# Patient Record
Sex: MALE | Race: BLACK OR AFRICAN AMERICAN | Employment: FULL TIME | URBAN - METROPOLITAN AREA
[De-identification: names, ages, dates, MRNs, and addresses within clinical notes are randomized per-mention and may not be internally consistent; named-entity substitution may affect disease eponyms.]

---

## 2021-08-03 VITALS
SYSTOLIC BLOOD PRESSURE: 126 MMHG | RESPIRATION RATE: 17 BRPM | TEMPERATURE: 99 F | HEART RATE: 86 BPM | DIASTOLIC BLOOD PRESSURE: 74 MMHG | OXYGEN SATURATION: 96 %

## 2021-08-03 LAB
RAPID INFLUENZA  B AGN: NEGATIVE
RAPID INFLUENZA A AGN: NEGATIVE
SARS-COV-2, NAAT: NOT DETECTED

## 2021-08-03 PROCEDURE — 99283 EMERGENCY DEPT VISIT LOW MDM: CPT

## 2021-08-03 PROCEDURE — 87635 SARS-COV-2 COVID-19 AMP PRB: CPT

## 2021-08-03 PROCEDURE — 87804 INFLUENZA ASSAY W/OPTIC: CPT

## 2021-08-03 ASSESSMENT — PAIN SCALES - GENERAL: PAINLEVEL_OUTOF10: 3

## 2021-08-03 ASSESSMENT — PAIN DESCRIPTION - LOCATION: LOCATION: GENERALIZED

## 2021-08-03 ASSESSMENT — PAIN DESCRIPTION - PAIN TYPE: TYPE: ACUTE PAIN

## 2021-08-03 ASSESSMENT — PAIN DESCRIPTION - DESCRIPTORS: DESCRIPTORS: ACHING

## 2021-08-04 ENCOUNTER — HOSPITAL ENCOUNTER (EMERGENCY)
Age: 26
Discharge: HOME | End: 2021-08-04
Attending: EMERGENCY MEDICINE

## 2021-08-04 DIAGNOSIS — R05.9 COUGH: ICD-10-CM

## 2021-08-04 DIAGNOSIS — R52 BODY ACHES: Primary | ICD-10-CM

## 2021-08-04 RX ORDER — METHYLPREDNISOLONE 4 MG/1
TABLET ORAL
Qty: 1 KIT | Refills: 0 | Status: SHIPPED | OUTPATIENT
Start: 2021-08-04

## 2021-08-04 RX ORDER — OXYMETAZOLINE HYDROCHLORIDE 0.05 G/100ML
2 SPRAY NASAL 2 TIMES DAILY
Qty: 1 BOTTLE | Refills: 3 | Status: SHIPPED | OUTPATIENT
Start: 2021-08-04 | End: 2021-09-03

## 2021-08-04 ASSESSMENT — ENCOUNTER SYMPTOMS
WHEEZING: 0
SHORTNESS OF BREATH: 0
CHOKING: 0
BLOOD IN STOOL: 0
DIARRHEA: 0
ABDOMINAL DISTENTION: 0
RECTAL PAIN: 0
EYE ITCHING: 0
PHOTOPHOBIA: 0
EYE DISCHARGE: 0
BACK PAIN: 0
ABDOMINAL PAIN: 0
NAUSEA: 0
CONSTIPATION: 0
COLOR CHANGE: 0
CHEST TIGHTNESS: 0
APNEA: 0
STRIDOR: 0
COUGH: 1
ANAL BLEEDING: 0
EYE REDNESS: 0
EYE PAIN: 0
VOMITING: 0

## 2021-08-04 NOTE — ED TRIAGE NOTES
Mary Ramirez is a 32 y.o. male brought himself to the ER for eval of fatigue that started Friday and a cough for the last 3 days. The patient is alert and oriented with an open and patent airway with a normal respiratory effort.

## 2021-08-04 NOTE — ED PROVIDER NOTES
[address] [facility]      Pt Name: Yves Carbone  MRN: [id number]  [place] 1995  Date of evaluation: 8/3/2021  Provider: Yuri Mejias MD    CHIEF COMPLAINT       Chief Complaint   Patient presents with    Generalized Body Aches     complaining of fatiuge, achiness, and cough x3 days. HISTORY OF PRESENT ILLNESS    Yves Carbone is a 32 y.o. male who presents to the emergency department with cough, congestion, body aches. Endorses 3-day history of symptoms. Presents to the emergency room to make sure he does not have Covid or the flu. Endorses tactile fevers. Decreased p.o. intake. Tolerating fluids. No nausea or vomiting. No constipation or diarrhea. No chest pain or shortness of breath. No abdominal pain. Positive for sick contacts. Denies any Covid exposures. Has been taking over-the-counter medication with some relief. No other associated symptoms. Nursing Notes were reviewed. Including nursing noted for FM, Surgical History, Past Medical History, Social History, vitals, and allergies; agree with all. REVIEW OF SYSTEMS       Review of Systems   Constitutional: Positive for activity change, appetite change, chills, fatigue and fever. Negative for diaphoresis and unexpected weight change. HENT: Positive for congestion. Negative for dental problem, drooling, ear discharge and ear pain. Eyes: Negative for photophobia, pain, discharge, redness, itching and visual disturbance. Respiratory: Positive for cough. Negative for apnea, choking, chest tightness, shortness of breath, wheezing and stridor. Cardiovascular: Negative for chest pain, palpitations and leg swelling. Gastrointestinal: Negative for abdominal distention, abdominal pain, anal bleeding, blood in stool, constipation, diarrhea, nausea, rectal pain and vomiting. Endocrine: Negative for cold intolerance and heat intolerance.    Genitourinary: Negative for decreased urine volume and urgency. Musculoskeletal: Positive for myalgias. Negative for arthralgias and back pain. Skin: Negative for color change and pallor. Neurological: Negative for dizziness and facial asymmetry. Hematological: Negative for adenopathy. Does not bruise/bleed easily. Psychiatric/Behavioral: Negative for agitation, behavioral problems, confusion and decreased concentration. Except as noted above the remainder of the review of systems was reviewed and negative. PAST MEDICAL HISTORY   History reviewed. No pertinent past medical history. SURGICAL HISTORY     History reviewed. No pertinent surgical history. CURRENT MEDICATIONS       Discharge Medication List as of 8/4/2021  1:33 AM          ALLERGIES     Patient has no known allergies. FAMILY HISTORY      History reviewed. No pertinent family history. SOCIAL HISTORY       Social History     Socioeconomic History    Marital status: Single     Spouse name: None    Number of children: None    Years of education: None    Highest education level: None   Occupational History    None   Tobacco Use    Smoking status: Never Smoker    Smokeless tobacco: Never Used   Vaping Use    Vaping Use: Never used   Substance and Sexual Activity    Alcohol use: Never    Drug use: Never    Sexual activity: None   Other Topics Concern    None   Social History Narrative    None     Social Determinants of Health     Financial Resource Strain:     Difficulty of Paying Living Expenses:    Food Insecurity:     Worried About Running Out of Food in the Last Year:     Ran Out of Food in the Last Year:    Transportation Needs:     Lack of Transportation (Medical):      Lack of Transportation (Non-Medical):    Physical Activity:     Days of Exercise per Week:     Minutes of Exercise per Session:    Stress:     Feeling of Stress :    Social Connections:     Frequency of Communication with Friends and Family:     Frequency of Social Gatherings with Friends and Family:     Attends Protestant Services:     Active Member of Clubs or Organizations:     Attends Club or Organization Meetings:     Marital Status:    Intimate Partner Violence:     Fear of Current or Ex-Partner:     Emotionally Abused:     Physically Abused:     Sexually Abused:        PHYSICAL EXAM       ED Triage Vitals [08/03/21 2115]   BP Temp Temp Source Pulse Resp SpO2 Height Weight   126/74 99 °F (37.2 °C) Oral 86 17 96 % -- --       Physical Exam  Vitals and nursing note reviewed. Constitutional:       General: He is not in acute distress. Appearance: He is well-developed. He is not diaphoretic. HENT:      Head: Normocephalic and atraumatic. Eyes:      General:         Right eye: No discharge. Left eye: No discharge. Pupils: Pupils are equal, round, and reactive to light. Neck:      Thyroid: No thyromegaly. Trachea: No tracheal deviation. Cardiovascular:      Rate and Rhythm: Normal rate and regular rhythm. Heart sounds: No murmur heard. Pulmonary:      Breath sounds: No wheezing or rales. Chest:      Chest wall: No tenderness. Abdominal:      General: There is no distension. Palpations: Abdomen is soft. There is no mass. Tenderness: There is no abdominal tenderness. There is no guarding or rebound. Musculoskeletal:         General: No tenderness or deformity. Normal range of motion. Cervical back: Normal range of motion. Skin:     General: Skin is warm. Coloration: Skin is not pale. Findings: No erythema or rash. Neurological:      Mental Status: He is alert. Cranial Nerves: No cranial nerve deficit. Motor: No abnormal muscle tone.       Coordination: Coordination normal.         DIAGNOSTIC RESULTS     EKG: All EKG's are interpreted by the Emergency Department Physician who either signs or Co-signs this chart in the absence of acardiologist.    None    RADIOLOGY: Non-plain film images such as CT, Ultrasoundand MRI are read by the radiologist. Isabelle Adair radiographic images are visualized and preliminarily interpreted by the emergency physician with the below findings:    Chest x-ray reassuring    ED BEDSIDE ULTRASOUND:   Performed by ED Physician - none    LABS:  Labs Reviewed   RAPID INFLUENZA A/B ANTIGENS    Narrative:     Performed at:  [facility]  [address] [place], [facility] [place] [address]   Phone ([phone] RAPID    Narrative:     Performed at:  [facility]  [address] [place], [place] [address]   Phone [phone]       All other labs were withinnormal range or not returned as of this dictation. EMERGENCY DEPARTMENT COURSE and DIFFERENTIAL DIAGNOSIS/MDM:     PMH, Surgical Hx, FH, Social Hx reviewed by myself (ETOH usage, Tobacco usage, Drug usage reviewed by myself, no pertinent Hx)- No Pertinent Hx     Old records were reviewed by me    59-year-old male with upper respiratory infection. Supportive care. I estimate there is LOW risk for Sepsis, MI, Stroke, Tamponade, PTX, Toxicity or other life threatening etiology thus I consider the discharge disposition reasonable. The patient is at low risk for mortality based on demographic, history and clinical factors. Given the best available information and clinical assessment, I estimate the risk of hospitalization to be greater than risk of treatment at home. I have explained to the patient that the risk could rapidly change, given precautions for return and instructions. Explained to patient that the risk for mortality is low based on demographic, history and clinical factors. I discussed with patient the results of evaluation in the ED, diagnosis, care, and prognosis. The plan is to discharge to home. Patient is in agreement with plan and questions have been answered.       I also discussed with patient the reasons which may require a return visit and the importance of follow-up care. The patient is well-appearing, nontoxic, and improved at the time of discharge. Patient agrees to call to arrange follow-up care as directed. Patient understands to return immediately for worsening/change in symptoms. CRITICAL CARE TIME   Total Critical Caretime was 21 minutes, excluding separately reportable procedures. There was a high probability of clinically significant/life threatening deterioration in the patient's condition which required my urgent intervention. PROCEDURES:  Unlessotherwise noted below, none    FINAL IMPRESSION      1. Body aches    2.  Cough          DISPOSITION/PLAN   DISPOSITION Decision To Discharge 08/04/2021 01:30:39 AM    PATIENT REFERRED TO:  PCP            DISCHARGE MEDICATIONS:  Discharge Medication List as of 8/4/2021  1:33 AM      START taking these medications    Details   methylPREDNISolone (MEDROL, LISETTE,) 4 MG tablet Take by mouth., Disp-1 kit, R-0Print      oxymetazoline (12 HOUR NASAL SPRAY) 0.05 % nasal spray 2 sprays by Nasal route 2 times daily, Disp-1 Bottle, R-3Print                (Please note that portions ofthis note were completed with a voice recognition program.  Efforts were made to edit the dictations but occasionally words are mis-transcribed.)    Chelsie Clay MD(electronically signed)  Attending Emergency Physician        Chelsie Clay MD  08/04/21 0311

## 2021-08-04 NOTE — ED NOTES
.Pt discharged at this time. Discharge instructions and medications reviewed,  Questions were answered. PT verbalized understanding. Follow up appointments were discussed.          [facility]  08/04/21 4722

## 2023-04-18 ENCOUNTER — HOSPITAL ENCOUNTER (EMERGENCY)
Age: 28
Discharge: HOME | End: 2023-04-18

## 2023-04-18 ENCOUNTER — APPOINTMENT (OUTPATIENT)
Dept: CT IMAGING | Age: 28
End: 2023-04-18

## 2023-04-18 VITALS
OXYGEN SATURATION: 98 % | WEIGHT: 184.97 LBS | SYSTOLIC BLOOD PRESSURE: 125 MMHG | DIASTOLIC BLOOD PRESSURE: 75 MMHG | RESPIRATION RATE: 17 BRPM | TEMPERATURE: 98.3 F | HEART RATE: 84 BPM

## 2023-04-18 DIAGNOSIS — K56.41 FECAL IMPACTION (HCC): ICD-10-CM

## 2023-04-18 DIAGNOSIS — K59.00 CONSTIPATION, UNSPECIFIED CONSTIPATION TYPE: Primary | ICD-10-CM

## 2023-04-18 LAB
ALBUMIN SERPL-MCNC: 4.3 G/DL (ref 3.4–5)
ALBUMIN/GLOB SERPL: 1.3 {RATIO} (ref 1.1–2.2)
ALP SERPL-CCNC: 58 U/L (ref 40–129)
ALT SERPL-CCNC: 22 U/L (ref 10–40)
ANION GAP SERPL CALCULATED.3IONS-SCNC: 11 MMOL/L (ref 3–16)
AST SERPL-CCNC: 25 U/L (ref 15–37)
BASOPHILS # BLD: 0 K/UL (ref 0–0.2)
BASOPHILS NFR BLD: 0.4 %
BILIRUB SERPL-MCNC: 0.4 MG/DL (ref 0–1)
BILIRUB UR QL STRIP.AUTO: NEGATIVE
BUN SERPL-MCNC: 8 MG/DL (ref 7–20)
CALCIUM SERPL-MCNC: 9 MG/DL (ref 8.3–10.6)
CHLORIDE SERPL-SCNC: 102 MMOL/L (ref 99–110)
CLARITY UR: CLEAR
CO2 SERPL-SCNC: 27 MMOL/L (ref 21–32)
COLOR UR: YELLOW
CREAT SERPL-MCNC: 0.9 MG/DL (ref 0.9–1.3)
DEPRECATED RDW RBC AUTO: 14.2 % (ref 12.4–15.4)
EOSINOPHIL # BLD: 0 K/UL (ref 0–0.6)
EOSINOPHIL NFR BLD: 0.4 %
GFR SERPLBLD CREATININE-BSD FMLA CKD-EPI: >60 ML/MIN/{1.73_M2}
GLUCOSE SERPL-MCNC: 101 MG/DL (ref 70–99)
GLUCOSE UR STRIP.AUTO-MCNC: NEGATIVE MG/DL
HCT VFR BLD AUTO: 47.1 % (ref 40.5–52.5)
HGB BLD-MCNC: 15.8 G/DL (ref 13.5–17.5)
HGB UR QL STRIP.AUTO: NEGATIVE
KETONES UR STRIP.AUTO-MCNC: NEGATIVE MG/DL
LEUKOCYTE ESTERASE UR QL STRIP.AUTO: NEGATIVE
LIPASE SERPL-CCNC: 17 U/L (ref 13–60)
LYMPHOCYTES # BLD: 2 K/UL (ref 1–5.1)
LYMPHOCYTES NFR BLD: 28.1 %
MCH RBC QN AUTO: 29.5 PG (ref 26–34)
MCHC RBC AUTO-ENTMCNC: 33.6 G/DL (ref 31–36)
MCV RBC AUTO: 87.9 FL (ref 80–100)
MONOCYTES # BLD: 0.4 K/UL (ref 0–1.3)
MONOCYTES NFR BLD: 5.5 %
NEUTROPHILS # BLD: 4.6 K/UL (ref 1.7–7.7)
NEUTROPHILS NFR BLD: 65.6 %
NITRITE UR QL STRIP.AUTO: NEGATIVE
PH UR STRIP.AUTO: 6.5 [PH] (ref 5–8)
PLATELET # BLD AUTO: 201 K/UL (ref 135–450)
PMV BLD AUTO: 9 FL (ref 5–10.5)
POTASSIUM SERPL-SCNC: 3.9 MMOL/L (ref 3.5–5.1)
PROT SERPL-MCNC: 7.6 G/DL (ref 6.4–8.2)
PROT UR STRIP.AUTO-MCNC: NEGATIVE MG/DL
RBC # BLD AUTO: 5.36 M/UL (ref 4.2–5.9)
SODIUM SERPL-SCNC: 140 MMOL/L (ref 136–145)
SP GR UR STRIP.AUTO: 1.02 (ref 1–1.03)
UA COMPLETE W REFLEX CULTURE PNL UR: NORMAL
UA DIPSTICK W REFLEX MICRO PNL UR: NORMAL
URN SPEC COLLECT METH UR: NORMAL
UROBILINOGEN UR STRIP-ACNC: 0.2 E.U./DL
WBC # BLD AUTO: 7 K/UL (ref 4–11)

## 2023-04-18 PROCEDURE — 83690 ASSAY OF LIPASE: CPT

## 2023-04-18 PROCEDURE — 81003 URINALYSIS AUTO W/O SCOPE: CPT

## 2023-04-18 PROCEDURE — 85025 COMPLETE CBC W/AUTO DIFF WBC: CPT

## 2023-04-18 PROCEDURE — 80053 COMPREHEN METABOLIC PANEL: CPT

## 2023-04-18 PROCEDURE — 6360000004 HC RX CONTRAST MEDICATION: Performed by: PHYSICIAN ASSISTANT

## 2023-04-18 PROCEDURE — 99285 EMERGENCY DEPT VISIT HI MDM: CPT

## 2023-04-18 PROCEDURE — 74177 CT ABD & PELVIS W/CONTRAST: CPT

## 2023-04-18 RX ORDER — POLYETHYLENE GLYCOL 3350 17 G/17G
17 POWDER, FOR SOLUTION ORAL DAILY PRN
Qty: 527 G | Refills: 1 | Status: SHIPPED | OUTPATIENT
Start: 2023-04-18 | End: 2023-05-18

## 2023-04-18 RX ORDER — SODIUM PHOSPHATE, DIBASIC AND SODIUM PHOSPHATE, MONOBASIC 7; 19 G/133ML; G/133ML
2 ENEMA RECTAL
Qty: 2 EACH | Refills: 0 | Status: SHIPPED | OUTPATIENT
Start: 2023-04-18 | End: 2023-04-18

## 2023-04-18 RX ADMIN — IOPAMIDOL 75 ML: 755 INJECTION, SOLUTION INTRAVENOUS at 17:29

## 2023-04-18 ASSESSMENT — ENCOUNTER SYMPTOMS
VOMITING: 0
BLOOD IN STOOL: 0
COUGH: 0
SHORTNESS OF BREATH: 0
DIARRHEA: 0
ABDOMINAL PAIN: 1
COLOR CHANGE: 0
NAUSEA: 1
CONSTIPATION: 0

## 2023-04-18 ASSESSMENT — PAIN - FUNCTIONAL ASSESSMENT: PAIN_FUNCTIONAL_ASSESSMENT: 0-10

## 2023-04-18 ASSESSMENT — PAIN SCALES - GENERAL: PAINLEVEL_OUTOF10: 6

## 2023-04-18 ASSESSMENT — PAIN DESCRIPTION - LOCATION: LOCATION: ABDOMEN

## 2023-04-18 ASSESSMENT — PAIN DESCRIPTION - DESCRIPTORS: DESCRIPTORS: ACHING;DULL

## 2023-04-18 ASSESSMENT — PAIN DESCRIPTION - ORIENTATION: ORIENTATION: LEFT;LOWER

## 2023-04-18 ASSESSMENT — PAIN DESCRIPTION - PAIN TYPE: TYPE: ACUTE PAIN

## 2023-04-18 NOTE — ED PROVIDER NOTES
Status: He is alert and oriented to person, place, and time. Psychiatric:         Behavior: Behavior normal. Behavior is cooperative. DIAGNOSTIC RESULTS   LABS:    Labs Reviewed   COMPREHENSIVE METABOLIC PANEL W/ REFLEX TO MG FOR LOW K - Abnormal; Notable for the following components:       Result Value    Glucose 101 (*)     All other components within normal limits   CBC WITH AUTO DIFFERENTIAL   LIPASE   URINALYSIS WITH REFLEX TO CULTURE       When ordered only abnormal lab results are displayed. All other labs were within normal range or not returned as of this dictation. EKG: When ordered, EKG's are interpreted by the Emergency Department Physician in the absence of a cardiologist.  Please see their note for interpretation of EKG. RADIOLOGY:   Non-plain film images such as CT, Ultrasound and MRI are read by the radiologist. Plain radiographic images are visualized and preliminarily interpreted by the ED Provider with the below findings:    Interpretation per the Radiologist below, if available at the time of this note:    CT ABDOMEN PELVIS W IV CONTRAST Additional Contrast? None   Preliminary Result   1. Normal appendix. Evidence of constipation and stool impaction in the   rectum. No bowel obstruction or perforation or thickening. 2. No evidence of gallbladder disease. No evidence of renal stones or   obstructive uropathy. No results found. No results found. PROCEDURES   Unless otherwise noted below, none     Procedures    PAST MEDICAL HISTORY      has no past medical history on file.      EMERGENCY DEPARTMENT COURSE and DIFFERENTIAL DIAGNOSIS/MDM:   Vitals:    Vitals:    04/18/23 1529 04/18/23 1534   BP:  125/75   Pulse: 84    Resp: 17    Temp: 98.3 °F (36.8 °C)    TempSrc: Oral    SpO2: 98%    Weight: 184 lb 15.5 oz (83.9 kg)        Patient was given the following medications:  Medications   iopamidol (ISOVUE-370) 76 % injection 75 mL (75 mLs IntraVENous Given 4/18/23 4499)

## 2024-07-24 ENCOUNTER — APPOINTMENT (OUTPATIENT)
Dept: GENERAL RADIOLOGY | Age: 29
End: 2024-07-24
Payer: COMMERCIAL

## 2024-07-24 ENCOUNTER — HOSPITAL ENCOUNTER (EMERGENCY)
Age: 29
Discharge: HOME | End: 2024-07-24
Attending: EMERGENCY MEDICINE
Payer: COMMERCIAL

## 2024-07-24 VITALS
RESPIRATION RATE: 22 BRPM | SYSTOLIC BLOOD PRESSURE: 113 MMHG | HEART RATE: 78 BPM | TEMPERATURE: 98 F | WEIGHT: 169.75 LBS | DIASTOLIC BLOOD PRESSURE: 79 MMHG | OXYGEN SATURATION: 99 %

## 2024-07-24 DIAGNOSIS — S61.241A: Primary | ICD-10-CM

## 2024-07-24 PROCEDURE — 6360000002 HC RX W HCPCS: Performed by: EMERGENCY MEDICINE

## 2024-07-24 PROCEDURE — 90471 IMMUNIZATION ADMIN: CPT | Performed by: EMERGENCY MEDICINE

## 2024-07-24 PROCEDURE — 90715 TDAP VACCINE 7 YRS/> IM: CPT | Performed by: EMERGENCY MEDICINE

## 2024-07-24 PROCEDURE — 73130 X-RAY EXAM OF HAND: CPT

## 2024-07-24 PROCEDURE — 6370000000 HC RX 637 (ALT 250 FOR IP): Performed by: EMERGENCY MEDICINE

## 2024-07-24 PROCEDURE — 99284 EMERGENCY DEPT VISIT MOD MDM: CPT

## 2024-07-24 PROCEDURE — 2500000003 HC RX 250 WO HCPCS: Performed by: EMERGENCY MEDICINE

## 2024-07-24 RX ORDER — LIDOCAINE HYDROCHLORIDE 10 MG/ML
10 INJECTION, SOLUTION EPIDURAL; INFILTRATION; INTRACAUDAL; PERINEURAL ONCE
Status: COMPLETED | OUTPATIENT
Start: 2024-07-24 | End: 2024-07-24

## 2024-07-24 RX ORDER — CEPHALEXIN 500 MG/1
500 CAPSULE ORAL ONCE
Status: COMPLETED | OUTPATIENT
Start: 2024-07-24 | End: 2024-07-24

## 2024-07-24 RX ORDER — CEPHALEXIN 500 MG/1
500 CAPSULE ORAL 4 TIMES DAILY
Qty: 20 CAPSULE | Refills: 0 | Status: SHIPPED | OUTPATIENT
Start: 2024-07-24 | End: 2024-07-29

## 2024-07-24 RX ADMIN — CEPHALEXIN 500 MG: 500 CAPSULE ORAL at 21:48

## 2024-07-24 RX ADMIN — LIDOCAINE HYDROCHLORIDE 10 ML: 10 INJECTION, SOLUTION EPIDURAL; INFILTRATION; INTRACAUDAL; PERINEURAL at 21:47

## 2024-07-24 RX ADMIN — TETANUS TOXOID, REDUCED DIPHTHERIA TOXOID AND ACELLULAR PERTUSSIS VACCINE, ADSORBED 0.5 ML: 5; 2.5; 8; 8; 2.5 SUSPENSION INTRAMUSCULAR at 21:49

## 2024-07-25 ENCOUNTER — TELEPHONE (OUTPATIENT)
Dept: ORTHOPEDIC SURGERY | Age: 29
End: 2024-07-25

## 2024-07-25 NOTE — DISCHARGE INSTRUCTIONS
Follow-up with the hand surgeon in 1 to 2 days for reexamination.    Watch for increased pain redness swelling fever or chills.  Keep clean and dry.    If condition worsens or new symptoms develop, return immediately to the emergency department

## 2024-07-25 NOTE — TELEPHONE ENCOUNTER
Unable to reach patient regarding being referred to outside facility due to limited appointments with our hand providers. Upon return call please relay info to patient.     Can follow up with either [facility] [phone] or [facility] [phone]

## 2024-07-25 NOTE — ED PROVIDER NOTES
provided.    Staple was removed easily after digital block.    Repeat left hand x-ray was obtained and reviewed.  There is no evidence of any retained foreign body.    He will be referred to hand surgery for follow-up in 1 to 2 days given the fact that the staple did penetrate the bone.  He was advised to watch for any infection and he will be placed on Keflex 500 mg 4 times daily for 5 days.  I recommended Tylenol or ibuprofen as directed for pain..  Advised him to call today for an appointment to be seen in 1 to 2 days.    Social Determinants of health were reviewed (Poverty, Education, uninsured) -none       Is this patient to be included in the SEP-1 Core Measure due to severe sepsis or septic shock?   No   Exclusion criteria - the patient is NOT to be included for SEP-1 Core Measure due to:  2+ SIRS criteria are not met      REASSESSMENT/ MEDICAL DECISION MAKING            Patient was given the following medications:   Medications   lidocaine PF 1 % injection 10 mL (10 mLs IntraDERmal Given 7/24/24 2147)   tetanus-diphth-acell pertussis (BOOSTRIX) injection 0.5 mL (0.5 mLs IntraMUSCular Given 7/24/24 2149)   cephALEXin (KEFLEX) capsule 500 mg (500 mg Oral Given 7/24/24 2148)          Follow-up with the hand surgeon in 1 to 2 days for reexamination.    Watch for increased pain redness swelling fever or chills.  Keep clean and dry.    If condition worsens or new symptoms develop, return immediately to the emergency department    I am the primary attending of record.    CRITICAL CARE TIME   Total Critical Care time was 0 minutes, excluding separately reportable procedures.  There was a high probability of clinically significant/life threatening deterioration in the patient's condition which required my urgent intervention.      CONSULTS:  None    PROCEDURES:  Unless otherwise noted below, none     Procedures      FINAL IMPRESSION      1. Puncture wound of left index finger with foreign body

## 2024-07-25 NOTE — ED NOTES
D/C: Order noted for d/c. Pt confirmed d/c paperwork has correct name. Discharge and education instructions reviewed with patient. Teach-back successful.  Pt verbalized understanding and denied questions at this time. No acute distress noted. Patient instructed to follow-up as noted - return to emergency department if symptoms worsen. Patient verbalized understanding. Discharged per EDMD with discharge instructions. Pt discharged to private vehicle. Patient stable upon departure. Thanked patient for choosing [facility] for care. Pt denies pain at the time of discharge

## 2025-01-30 ENCOUNTER — APPOINTMENT (OUTPATIENT)
Dept: GENERAL RADIOLOGY | Age: 30
End: 2025-01-30

## 2025-01-30 ENCOUNTER — HOSPITAL ENCOUNTER (EMERGENCY)
Age: 30
Discharge: HOME | End: 2025-01-30

## 2025-01-30 VITALS
HEIGHT: 70 IN | TEMPERATURE: 98.6 F | HEART RATE: 80 BPM | SYSTOLIC BLOOD PRESSURE: 123 MMHG | BODY MASS INDEX: 24.11 KG/M2 | OXYGEN SATURATION: 98 % | WEIGHT: 168.43 LBS | DIASTOLIC BLOOD PRESSURE: 83 MMHG | RESPIRATION RATE: 18 BRPM

## 2025-01-30 DIAGNOSIS — S60.351A FOREIGN BODY OF RIGHT THUMB, INITIAL ENCOUNTER: Primary | ICD-10-CM

## 2025-01-30 PROCEDURE — 73130 X-RAY EXAM OF HAND: CPT

## 2025-01-30 PROCEDURE — 99283 EMERGENCY DEPT VISIT LOW MDM: CPT

## 2025-01-30 RX ORDER — CEPHALEXIN 500 MG/1
500 CAPSULE ORAL 4 TIMES DAILY
Qty: 28 CAPSULE | Refills: 0 | Status: SHIPPED | OUTPATIENT
Start: 2025-01-30 | End: 2025-02-06

## 2025-01-30 ASSESSMENT — LIFESTYLE VARIABLES
HOW MANY STANDARD DRINKS CONTAINING ALCOHOL DO YOU HAVE ON A TYPICAL DAY: 1 OR 2
HOW OFTEN DO YOU HAVE A DRINK CONTAINING ALCOHOL: 2-4 TIMES A MONTH

## 2025-01-30 NOTE — ED PROVIDER NOTES
normal.                       DIAGNOSTIC RESULTS   LABS:    Labs Reviewed - No data to display    When ordered only abnormal lab results are displayed. All other labs were within normal range or not returned as of this dictation.    EKG: When ordered, EKG's are interpreted by the Emergency Department Physician in the absence of a cardiologist.  Please see their note for interpretation of EKG.    RADIOLOGY:   Non-plain film images such as CT, Ultrasound and MRI are read by the radiologist. Plain radiographic images are visualized and preliminarily interpreted by the ED Provider with the below findings:        Interpretation per the Radiologist below, if available at the time of this note:    XR HAND RIGHT (MIN 3 VIEWS)   Final Result   No radiopaque foreign body.  If there is still concern about a foreign body   in the soft tissues, ultrasound recommended.           XR HAND RIGHT (MIN 3 VIEWS)    Result Date: 1/30/2025  EXAMINATION: THREE XRAY VIEWS OF THE RIGHT HAND 1/30/2025 3:45 pm COMPARISON: None. HISTORY: ORDERING SYSTEM PROVIDED HISTORY: possible splinter right thumb, palmar TECHNOLOGIST PROVIDED HISTORY: Reason for exam:->possible splinter right thumb, palmar FINDINGS: The hand appears unremarkable. The metacarpals and the phalanges appear normal with no fractures. Joint spaces are well maintained. Soft tissues appear unremarkable.     No radiopaque foreign body.  If there is still concern about a foreign body in the soft tissues, ultrasound recommended.       No results found.    PROCEDURES   Unless otherwise noted below, none     Procedures    CRITICAL CARE TIME (.cctime)       PAST MEDICAL HISTORY      has no past medical history on file.     Chronic Conditions affecting Care:     EMERGENCY DEPARTMENT COURSE and DIFFERENTIAL DIAGNOSIS/MDM:   Vitals:    Vitals:    01/30/25 1502 01/30/25 1831   BP: 123/83    Pulse: 87 80   Resp: 18 18   Temp: 98.6 °F (37 °C)    TempSrc: Oral    SpO2: 98% 98%   Weight: 76.4

## 2025-01-30 NOTE — ED NOTES

## 2025-01-31 ENCOUNTER — TELEPHONE (OUTPATIENT)
Dept: ORTHOPEDIC SURGERY | Age: 30
End: 2025-01-31

## 2025-02-01 SDOH — HEALTH STABILITY: PHYSICAL HEALTH: ON AVERAGE, HOW MANY MINUTES DO YOU ENGAGE IN EXERCISE AT THIS LEVEL?: 10 MIN

## 2025-02-01 SDOH — HEALTH STABILITY: PHYSICAL HEALTH: ON AVERAGE, HOW MANY DAYS PER WEEK DO YOU ENGAGE IN MODERATE TO STRENUOUS EXERCISE (LIKE A BRISK WALK)?: 1 DAY

## 2025-02-03 ENCOUNTER — ANESTHESIA (OUTPATIENT)
Dept: OPERATING ROOM | Age: 30
DRG: 603 | End: 2025-02-03

## 2025-02-03 ENCOUNTER — PREP FOR PROCEDURE (OUTPATIENT)
Dept: ORTHOPEDIC SURGERY | Age: 30
End: 2025-02-03

## 2025-02-03 ENCOUNTER — HOSPITAL ENCOUNTER (INPATIENT)
Age: 30
LOS: 2 days | Discharge: HOME | DRG: 603 | End: 2025-02-05
Attending: ORTHOPAEDIC SURGERY | Admitting: INTERNAL MEDICINE

## 2025-02-03 ENCOUNTER — TELEPHONE (OUTPATIENT)
Dept: ORTHOPEDIC SURGERY | Age: 30
End: 2025-02-03

## 2025-02-03 ENCOUNTER — ANESTHESIA EVENT (OUTPATIENT)
Dept: OPERATING ROOM | Age: 30
DRG: 603 | End: 2025-02-03

## 2025-02-03 ENCOUNTER — OFFICE VISIT (OUTPATIENT)
Dept: ORTHOPEDIC SURGERY | Age: 30
End: 2025-02-03

## 2025-02-03 VITALS — WEIGHT: 168 LBS | BODY MASS INDEX: 24.05 KG/M2 | HEIGHT: 70 IN | RESPIRATION RATE: 14 BRPM

## 2025-02-03 DIAGNOSIS — L08.9 INFECTION OF THUMB: ICD-10-CM

## 2025-02-03 DIAGNOSIS — L08.9 FINGER INFECTION: Primary | ICD-10-CM

## 2025-02-03 DIAGNOSIS — M65.949 TENOSYNOVITIS OF FINGER AND HAND: Primary | ICD-10-CM

## 2025-02-03 PROBLEM — S60.311A INFECTED ABRASION OF RIGHT THUMB: Status: ACTIVE | Noted: 2025-02-03

## 2025-02-03 PROBLEM — Z98.890 STATUS POST INCISION AND DRAINAGE: Status: ACTIVE | Noted: 2025-02-03

## 2025-02-03 PROBLEM — S60.311A: Status: ACTIVE | Noted: 2025-02-03

## 2025-02-03 LAB
ALBUMIN SERPL-MCNC: 4.2 G/DL (ref 3.4–5)
ALBUMIN/GLOB SERPL: 1.4 {RATIO} (ref 1.1–2.2)
ALP SERPL-CCNC: 52 U/L (ref 40–129)
ALT SERPL-CCNC: 23 U/L (ref 10–40)
ANION GAP SERPL CALCULATED.3IONS-SCNC: 10 MMOL/L (ref 3–16)
AST SERPL-CCNC: 31 U/L (ref 15–37)
BASOPHILS # BLD: 0 K/UL (ref 0–0.2)
BASOPHILS NFR BLD: 0.3 %
BILIRUB SERPL-MCNC: <0.2 MG/DL (ref 0–1)
BUN SERPL-MCNC: 7 MG/DL (ref 7–20)
CALCIUM SERPL-MCNC: 9.2 MG/DL (ref 8.3–10.6)
CHLORIDE SERPL-SCNC: 103 MMOL/L (ref 99–110)
CO2 SERPL-SCNC: 26 MMOL/L (ref 21–32)
CREAT SERPL-MCNC: 0.9 MG/DL (ref 0.9–1.3)
CRP SERPL-MCNC: <3 MG/L (ref 0–5.1)
DEPRECATED RDW RBC AUTO: 13.9 % (ref 12.4–15.4)
EOSINOPHIL # BLD: 0 K/UL (ref 0–0.6)
EOSINOPHIL NFR BLD: 0 %
ERYTHROCYTE [SEDIMENTATION RATE] IN BLOOD BY WESTERGREN METHOD: 15 MM/HR (ref 0–15)
GFR SERPLBLD CREATININE-BSD FMLA CKD-EPI: >90 ML/MIN/{1.73_M2}
GLUCOSE SERPL-MCNC: 104 MG/DL (ref 70–99)
HCT VFR BLD AUTO: 44.8 % (ref 40.5–52.5)
HGB BLD-MCNC: 15 G/DL (ref 13.5–17.5)
LYMPHOCYTES # BLD: 0.7 K/UL (ref 1–5.1)
LYMPHOCYTES NFR BLD: 7 %
MCH RBC QN AUTO: 29.3 PG (ref 26–34)
MCHC RBC AUTO-ENTMCNC: 33.4 G/DL (ref 31–36)
MCV RBC AUTO: 87.5 FL (ref 80–100)
MONOCYTES # BLD: 0.1 K/UL (ref 0–1.3)
MONOCYTES NFR BLD: 0.7 %
NEUTROPHILS # BLD: 9.6 K/UL (ref 1.7–7.7)
NEUTROPHILS NFR BLD: 92 %
PLATELET # BLD AUTO: 152 K/UL (ref 135–450)
PMV BLD AUTO: 9.8 FL (ref 5–10.5)
POTASSIUM SERPL-SCNC: 4.3 MMOL/L (ref 3.5–5.1)
PROT SERPL-MCNC: 7.2 G/DL (ref 6.4–8.2)
RBC # BLD AUTO: 5.12 M/UL (ref 4.2–5.9)
SODIUM SERPL-SCNC: 139 MMOL/L (ref 136–145)
WBC # BLD AUTO: 10.5 K/UL (ref 4–11)

## 2025-02-03 PROCEDURE — 3700000001 HC ADD 15 MINUTES (ANESTHESIA): Performed by: ORTHOPAEDIC SURGERY

## 2025-02-03 PROCEDURE — 85025 COMPLETE CBC W/AUTO DIFF WBC: CPT

## 2025-02-03 PROCEDURE — 7100000001 HC PACU RECOVERY - ADDTL 15 MIN: Performed by: ORTHOPAEDIC SURGERY

## 2025-02-03 PROCEDURE — 86140 C-REACTIVE PROTEIN: CPT

## 2025-02-03 PROCEDURE — 88300 SURGICAL PATH GROSS: CPT

## 2025-02-03 PROCEDURE — 6360000002 HC RX W HCPCS: Performed by: ORTHOPAEDIC SURGERY

## 2025-02-03 PROCEDURE — 2709999900 HC NON-CHARGEABLE SUPPLY: Performed by: ORTHOPAEDIC SURGERY

## 2025-02-03 PROCEDURE — 87185 SC STD ENZYME DETCJ PER NZM: CPT

## 2025-02-03 PROCEDURE — 85652 RBC SED RATE AUTOMATED: CPT

## 2025-02-03 PROCEDURE — 1200000000 HC SEMI PRIVATE

## 2025-02-03 PROCEDURE — 7100000000 HC PACU RECOVERY - FIRST 15 MIN: Performed by: ORTHOPAEDIC SURGERY

## 2025-02-03 PROCEDURE — 80053 COMPREHEN METABOLIC PANEL: CPT

## 2025-02-03 PROCEDURE — 99223 1ST HOSP IP/OBS HIGH 75: CPT | Performed by: INTERNAL MEDICINE

## 2025-02-03 PROCEDURE — 87075 CULTR BACTERIA EXCEPT BLOOD: CPT

## 2025-02-03 PROCEDURE — 6370000000 HC RX 637 (ALT 250 FOR IP): Performed by: INTERNAL MEDICINE

## 2025-02-03 PROCEDURE — 6360000002 HC RX W HCPCS: Performed by: ANESTHESIOLOGY

## 2025-02-03 PROCEDURE — 2500000003 HC RX 250 WO HCPCS: Performed by: ORTHOPAEDIC SURGERY

## 2025-02-03 PROCEDURE — 99204 OFFICE O/P NEW MOD 45 MIN: CPT | Performed by: PHYSICIAN ASSISTANT

## 2025-02-03 PROCEDURE — 6360000002 HC RX W HCPCS

## 2025-02-03 PROCEDURE — 6360000002 HC RX W HCPCS: Performed by: INTERNAL MEDICINE

## 2025-02-03 PROCEDURE — 2500000003 HC RX 250 WO HCPCS: Performed by: INTERNAL MEDICINE

## 2025-02-03 PROCEDURE — 0J9J0ZZ DRAINAGE OF RIGHT HAND SUBCUTANEOUS TISSUE AND FASCIA, OPEN APPROACH: ICD-10-PCS | Performed by: ORTHOPAEDIC SURGERY

## 2025-02-03 PROCEDURE — 87070 CULTURE OTHR SPECIMN AEROBIC: CPT

## 2025-02-03 PROCEDURE — 2580000003 HC RX 258: Performed by: INTERNAL MEDICINE

## 2025-02-03 PROCEDURE — 3700000000 HC ANESTHESIA ATTENDED CARE: Performed by: ORTHOPAEDIC SURGERY

## 2025-02-03 PROCEDURE — 3600000015 HC SURGERY LEVEL 5 ADDTL 15MIN: Performed by: ORTHOPAEDIC SURGERY

## 2025-02-03 PROCEDURE — 87205 SMEAR GRAM STAIN: CPT

## 2025-02-03 PROCEDURE — 2580000003 HC RX 258: Performed by: ANESTHESIOLOGY

## 2025-02-03 PROCEDURE — 87077 CULTURE AEROBIC IDENTIFY: CPT

## 2025-02-03 PROCEDURE — 0H9FXZZ DRAINAGE OF RIGHT HAND SKIN, EXTERNAL APPROACH: ICD-10-PCS | Performed by: ORTHOPAEDIC SURGERY

## 2025-02-03 PROCEDURE — 3600000005 HC SURGERY LEVEL 5 BASE: Performed by: ORTHOPAEDIC SURGERY

## 2025-02-03 PROCEDURE — 36415 COLL VENOUS BLD VENIPUNCTURE: CPT

## 2025-02-03 RX ORDER — MEPERIDINE HYDROCHLORIDE 25 MG/ML
12.5 INJECTION INTRAMUSCULAR; INTRAVENOUS; SUBCUTANEOUS
Status: DISCONTINUED | OUTPATIENT
Start: 2025-02-03 | End: 2025-02-03 | Stop reason: HOSPADM

## 2025-02-03 RX ORDER — CEFAZOLIN SODIUM 1 G/3ML
INJECTION, POWDER, FOR SOLUTION INTRAMUSCULAR; INTRAVENOUS
Status: DISCONTINUED | OUTPATIENT
Start: 2025-02-03 | End: 2025-02-03 | Stop reason: SDUPTHER

## 2025-02-03 RX ORDER — PROPOFOL 10 MG/ML
INJECTION, EMULSION INTRAVENOUS
Status: DISCONTINUED | OUTPATIENT
Start: 2025-02-03 | End: 2025-02-03 | Stop reason: SDUPTHER

## 2025-02-03 RX ORDER — MORPHINE SULFATE 4 MG/ML
4 INJECTION, SOLUTION INTRAMUSCULAR; INTRAVENOUS
Status: DISCONTINUED | OUTPATIENT
Start: 2025-02-03 | End: 2025-02-04

## 2025-02-03 RX ORDER — ACETAMINOPHEN 325 MG/1
650 TABLET ORAL EVERY 4 HOURS PRN
Status: DISCONTINUED | OUTPATIENT
Start: 2025-02-03 | End: 2025-02-05 | Stop reason: HOSPADM

## 2025-02-03 RX ORDER — ONDANSETRON 2 MG/ML
4 INJECTION INTRAMUSCULAR; INTRAVENOUS
Status: DISCONTINUED | OUTPATIENT
Start: 2025-02-03 | End: 2025-02-03 | Stop reason: HOSPADM

## 2025-02-03 RX ORDER — SODIUM CHLORIDE 0.9 % (FLUSH) 0.9 %
5-40 SYRINGE (ML) INJECTION PRN
Status: DISCONTINUED | OUTPATIENT
Start: 2025-02-03 | End: 2025-02-03 | Stop reason: HOSPADM

## 2025-02-03 RX ORDER — SODIUM CHLORIDE 0.9 % (FLUSH) 0.9 %
5-40 SYRINGE (ML) INJECTION EVERY 12 HOURS SCHEDULED
Status: DISCONTINUED | OUTPATIENT
Start: 2025-02-03 | End: 2025-02-03 | Stop reason: HOSPADM

## 2025-02-03 RX ORDER — SODIUM CHLORIDE 9 MG/ML
INJECTION, SOLUTION INTRAVENOUS PRN
Status: DISCONTINUED | OUTPATIENT
Start: 2025-02-03 | End: 2025-02-03 | Stop reason: HOSPADM

## 2025-02-03 RX ORDER — MIDAZOLAM HYDROCHLORIDE 1 MG/ML
INJECTION, SOLUTION INTRAMUSCULAR; INTRAVENOUS
Status: DISCONTINUED | OUTPATIENT
Start: 2025-02-03 | End: 2025-02-03 | Stop reason: SDUPTHER

## 2025-02-03 RX ORDER — SODIUM CHLORIDE 0.9 % (FLUSH) 0.9 %
10 SYRINGE (ML) INJECTION EVERY 12 HOURS SCHEDULED
Status: DISCONTINUED | OUTPATIENT
Start: 2025-02-03 | End: 2025-02-05 | Stop reason: HOSPADM

## 2025-02-03 RX ORDER — NALOXONE HYDROCHLORIDE 0.4 MG/ML
INJECTION, SOLUTION INTRAMUSCULAR; INTRAVENOUS; SUBCUTANEOUS PRN
Status: DISCONTINUED | OUTPATIENT
Start: 2025-02-03 | End: 2025-02-03 | Stop reason: HOSPADM

## 2025-02-03 RX ORDER — DEXAMETHASONE SODIUM PHOSPHATE 4 MG/ML
INJECTION, SOLUTION INTRA-ARTICULAR; INTRALESIONAL; INTRAMUSCULAR; INTRAVENOUS; SOFT TISSUE
Status: DISCONTINUED | OUTPATIENT
Start: 2025-02-03 | End: 2025-02-03 | Stop reason: SDUPTHER

## 2025-02-03 RX ORDER — ONDANSETRON 2 MG/ML
INJECTION INTRAMUSCULAR; INTRAVENOUS
Status: DISCONTINUED | OUTPATIENT
Start: 2025-02-03 | End: 2025-02-03 | Stop reason: SDUPTHER

## 2025-02-03 RX ORDER — FENTANYL CITRATE 50 UG/ML
INJECTION, SOLUTION INTRAMUSCULAR; INTRAVENOUS
Status: DISCONTINUED | OUTPATIENT
Start: 2025-02-03 | End: 2025-02-03 | Stop reason: SDUPTHER

## 2025-02-03 RX ORDER — LINEZOLID 2 MG/ML
600 INJECTION, SOLUTION INTRAVENOUS 2 TIMES DAILY
Status: DISCONTINUED | OUTPATIENT
Start: 2025-02-03 | End: 2025-02-04

## 2025-02-03 RX ORDER — SODIUM CHLORIDE 9 MG/ML
INJECTION, SOLUTION INTRAVENOUS PRN
Status: DISCONTINUED | OUTPATIENT
Start: 2025-02-03 | End: 2025-02-05 | Stop reason: HOSPADM

## 2025-02-03 RX ORDER — OXYCODONE HYDROCHLORIDE 5 MG/1
5 TABLET ORAL EVERY 4 HOURS PRN
Status: DISCONTINUED | OUTPATIENT
Start: 2025-02-03 | End: 2025-02-04

## 2025-02-03 RX ORDER — OXYCODONE HYDROCHLORIDE 10 MG/1
10 TABLET ORAL EVERY 4 HOURS PRN
Status: DISCONTINUED | OUTPATIENT
Start: 2025-02-03 | End: 2025-02-04

## 2025-02-03 RX ORDER — ACETAMINOPHEN 650 MG/1
650 SUPPOSITORY RECTAL EVERY 4 HOURS PRN
Status: DISCONTINUED | OUTPATIENT
Start: 2025-02-03 | End: 2025-02-05 | Stop reason: HOSPADM

## 2025-02-03 RX ORDER — FENTANYL CITRATE 0.05 MG/ML
50 INJECTION, SOLUTION INTRAMUSCULAR; INTRAVENOUS EVERY 5 MIN PRN
Status: DISCONTINUED | OUTPATIENT
Start: 2025-02-03 | End: 2025-02-03 | Stop reason: HOSPADM

## 2025-02-03 RX ORDER — POLYETHYLENE GLYCOL 3350 17 G/17G
17 POWDER, FOR SOLUTION ORAL DAILY PRN
Status: DISCONTINUED | OUTPATIENT
Start: 2025-02-03 | End: 2025-02-05 | Stop reason: HOSPADM

## 2025-02-03 RX ORDER — FENTANYL CITRATE 0.05 MG/ML
25 INJECTION, SOLUTION INTRAMUSCULAR; INTRAVENOUS EVERY 5 MIN PRN
Status: DISCONTINUED | OUTPATIENT
Start: 2025-02-03 | End: 2025-02-03 | Stop reason: HOSPADM

## 2025-02-03 RX ORDER — SODIUM CHLORIDE 0.9 % (FLUSH) 0.9 %
10 SYRINGE (ML) INJECTION PRN
Status: DISCONTINUED | OUTPATIENT
Start: 2025-02-03 | End: 2025-02-05 | Stop reason: HOSPADM

## 2025-02-03 RX ORDER — ONDANSETRON 2 MG/ML
4 INJECTION INTRAMUSCULAR; INTRAVENOUS EVERY 4 HOURS PRN
Status: DISCONTINUED | OUTPATIENT
Start: 2025-02-03 | End: 2025-02-05 | Stop reason: HOSPADM

## 2025-02-03 RX ORDER — MORPHINE SULFATE 2 MG/ML
2 INJECTION, SOLUTION INTRAMUSCULAR; INTRAVENOUS
Status: DISCONTINUED | OUTPATIENT
Start: 2025-02-03 | End: 2025-02-04

## 2025-02-03 RX ADMIN — FENTANYL CITRATE 100 MCG: 50 INJECTION INTRAMUSCULAR; INTRAVENOUS at 15:27

## 2025-02-03 RX ADMIN — CEFAZOLIN 2 G: 1 INJECTION, POWDER, FOR SOLUTION INTRAMUSCULAR; INTRAVENOUS at 15:46

## 2025-02-03 RX ADMIN — MIDAZOLAM 2 MG: 1 INJECTION INTRAMUSCULAR; INTRAVENOUS at 15:17

## 2025-02-03 RX ADMIN — PIPERACILLIN AND TAZOBACTAM 4500 MG: 4; .5 INJECTION, POWDER, FOR SOLUTION INTRAVENOUS at 21:52

## 2025-02-03 RX ADMIN — FENTANYL CITRATE 50 MCG: 0.05 INJECTION, SOLUTION INTRAMUSCULAR; INTRAVENOUS at 16:22

## 2025-02-03 RX ADMIN — PROPOFOL 50 MG: 10 INJECTION, EMULSION INTRAVENOUS at 15:26

## 2025-02-03 RX ADMIN — FENTANYL CITRATE 25 MCG: 0.05 INJECTION, SOLUTION INTRAMUSCULAR; INTRAVENOUS at 16:39

## 2025-02-03 RX ADMIN — DEXAMETHASONE SODIUM PHOSPHATE 8 MG: 4 INJECTION, SOLUTION INTRAMUSCULAR; INTRAVENOUS at 15:23

## 2025-02-03 RX ADMIN — FENTANYL CITRATE 25 MCG: 0.05 INJECTION, SOLUTION INTRAMUSCULAR; INTRAVENOUS at 17:10

## 2025-02-03 RX ADMIN — PROPOFOL 250 MG: 10 INJECTION, EMULSION INTRAVENOUS at 15:20

## 2025-02-03 RX ADMIN — ONDANSETRON 4 MG: 2 INJECTION INTRAMUSCULAR; INTRAVENOUS at 15:23

## 2025-02-03 RX ADMIN — FENTANYL CITRATE 100 MCG: 50 INJECTION INTRAMUSCULAR; INTRAVENOUS at 15:19

## 2025-02-03 RX ADMIN — MORPHINE SULFATE 4 MG: 4 INJECTION, SOLUTION INTRAMUSCULAR; INTRAVENOUS at 21:33

## 2025-02-03 RX ADMIN — SODIUM CHLORIDE: 9 INJECTION, SOLUTION INTRAVENOUS at 15:17

## 2025-02-03 RX ADMIN — PROPOFOL 50 MG: 10 INJECTION, EMULSION INTRAVENOUS at 15:23

## 2025-02-03 RX ADMIN — LINEZOLID 600 MG: 600 INJECTION, SOLUTION INTRAVENOUS at 20:17

## 2025-02-03 RX ADMIN — ACETAMINOPHEN 650 MG: 325 TABLET ORAL at 20:18

## 2025-02-03 RX ADMIN — SODIUM CHLORIDE, PRESERVATIVE FREE 10 ML: 5 INJECTION INTRAVENOUS at 20:18

## 2025-02-03 ASSESSMENT — PAIN DESCRIPTION - LOCATION
LOCATION: HAND
LOCATION: FINGER (COMMENT WHICH ONE)
LOCATION: FINGER (COMMENT WHICH ONE);HAND

## 2025-02-03 ASSESSMENT — PAIN SCALES - GENERAL
PAINLEVEL_OUTOF10: 10
PAINLEVEL_OUTOF10: 3
PAINLEVEL_OUTOF10: 0
PAINLEVEL_OUTOF10: 5
PAINLEVEL_OUTOF10: 1
PAINLEVEL_OUTOF10: 1
PAINLEVEL_OUTOF10: 2
PAINLEVEL_OUTOF10: 3
PAINLEVEL_OUTOF10: 5
PAINLEVEL_OUTOF10: 6
PAINLEVEL_OUTOF10: 10

## 2025-02-03 ASSESSMENT — PAIN DESCRIPTION - DESCRIPTORS
DESCRIPTORS: THROBBING;POUNDING
DESCRIPTORS: DISCOMFORT;BURNING
DESCRIPTORS: ACHING
DESCRIPTORS: BURNING
DESCRIPTORS: BURNING
DESCRIPTORS: ACHING;BURNING

## 2025-02-03 ASSESSMENT — PAIN DESCRIPTION - PAIN TYPE
TYPE: SURGICAL PAIN

## 2025-02-03 ASSESSMENT — PAIN DESCRIPTION - ONSET
ONSET: ON-GOING
ONSET: ON-GOING
ONSET: AWAKENED FROM SLEEP

## 2025-02-03 ASSESSMENT — LIFESTYLE VARIABLES: SMOKING_STATUS: 1

## 2025-02-03 ASSESSMENT — PAIN DESCRIPTION - ORIENTATION
ORIENTATION: RIGHT

## 2025-02-03 ASSESSMENT — PAIN DESCRIPTION - FREQUENCY
FREQUENCY: CONTINUOUS

## 2025-02-03 ASSESSMENT — PAIN - FUNCTIONAL ASSESSMENT
PAIN_FUNCTIONAL_ASSESSMENT: 0-10
PAIN_FUNCTIONAL_ASSESSMENT: PREVENTS OR INTERFERES SOME ACTIVE ACTIVITIES AND ADLS

## 2025-02-03 NOTE — H&P
Pre-operative Update of H&P:    I  have seen & examined Mr. Perry Ruedauri related solely to his hand and upper extremity conditions, prior to the scheduled procedure on the date of his surgery.  The indications for the planned surgical procedure & and his upper-extremity condition are unchanged.

## 2025-02-03 NOTE — H&P
Hospital Medicine  History and Physical    PCP: No primary care provider on file.  Patient Name: Perry Ramirez    Information for this report comes from multiple sources including the emergency room providers, the patient (when able to provide information), and from family/friends when at bedside     Date of Service: Pt seen/examined on 2/3/25 and admitted to Inpatient with expected LOS greater than two midnights due to medical therapy    CHIEF COMPLAINT:  Pt is s/p I&D for right thumb infection  HISTORY OF PRESENT ILLNESS: Pt is an 29 y.o. year-old male who has no past medical history on file who underwent incision & drainage with irrigation and debridement of his right thumb today due to right thumb flexor tendon sheath infection - suppurative flexor tenosynovitis. Per report, he developed erythema, tenderness, and pain of the volar aspect of his right thumb on or about 1/31/2025 after a piece of wood went into his right thumb. His symptoms worsened and he was seen by Hand Surgery and underwent an incision & drainage with irrigation and debridement of his right thumb today, 2/3/2025. We were asked to admit him to the hospital for further evaluation and treatment. Associated signs and symptoms do not include fever or chills, nausea or vomiting.       Past Medical History:    History reviewed. No pertinent past medical history.    Past Surgical History:    History reviewed. No pertinent surgical history.    Allergies:  Patient has no known allergies.    Medications Prior to Admission:    Prior to Admission medications    Medication Sig Start Date End Date Taking? Authorizing Provider   cephALEXin (KEFLEX) 500 MG capsule Take 1 capsule by mouth 4 times daily for 7 days 1/30/25 2/6/25 Yes Michelle Rouse, PA-C   methylPREDNISolone (MEDROL, LISETTE,) 4 MG tablet Take by mouth.  Patient not taking: Reported on 2/3/2025 8/4/21   Raffi Silva MD       Family History:   Family history is negative for accelerated  coronary artery disease, diabetes or malignancies.    Social History:   TOBACCO:   reports that he has never smoked. He has never used smokeless tobacco.  ETOH:   reports no history of alcohol use.  OCCUPATION:      REVIEW OF SYSTEMS:  A full review of systems was performed and is negative except for that which appears in the HPI    Physical Exam:    Vitals: BP (!) 128/93   Pulse 68   Temp 97.8 °F (36.6 °C)   Resp 14   SpO2 100%   General appearance: WD/WN 29 y.o. year-old male who is alert, appears stated age and is cooperative  HEENT: Head: Normocephalic, no lesions, without obvious abnormality.  Eye: Normal external eye, conjunctiva, lids cornea, PEERL.  Ears: Normal external ears. Non-tender.  Nose: Normal external nose, mucus membranes and septum.  Pharynx: Dental Hygiene adequate. Normal buccal mucosa. Normal pharynx.  Neck: no adenopathy, no carotid bruit, no JVD, supple, symmetrical, trachea midline and thyroid not enlarged, symmetric, no tenderness/mass/nodules  Lungs: clear to auscultation bilaterally and no use of accessory muscles  Heart: regular rate and rhythm, S1, S2 normal, no murmur, click, rub or gallop and normal apical impulse  Abdomen: soft, non-tender; bowel sounds normal; no masses, no organomegaly  Extremities: Right arm is being elevated. Right hand is bandaged. Otherwise extremities atraumatic, no cyanosis or edema and Homans sign is negative, no sign of DVT.    Capillary Refill: Acceptable < 3 seconds   Peripheral Pulses: +3 easily felt, not easily obliterated with pressures  Neurologic: Neurovascularly intact without any focal sensory/motor deficits. Cranial nerves: II-XII intact, grossly non-focal. Gait was not tested.  Mental Status: Alert and oriented, thought content appropriate, normal insight. Easily confused as he is currently in PACU waking up from anesthesia.         CBC: No results for input(s): \"WBC\", \"HGB\", \"PLT\" in the last 72 hours.  BMP:  No results for input(s): \"NA\",  \"K\", \"CL\", \"CO2\", \"BUN\", \"CREATININE\", \"GLUCOSE\" in the last 72 hours.  Troponin: No results for input(s): \"TROPONINI\" in the last 72 hours.  PT/INR:  No results found for: \"PTINR\"  U/A:    Lab Results   Component Value Date/Time    LEUKOCYTESUR Negative 04/18/2023 03:42 PM    UROBILINOGEN 0.2 04/18/2023 03:42 PM    BILIRUBINUR Negative 04/18/2023 03:42 PM    BLOODU Negative 04/18/2023 03:42 PM    GLUCOSEU Negative 04/18/2023 03:42 PM    PROTEINU Negative 04/18/2023 03:42 PM         RAD:   I have independently reviewed and interpreted the imaging studies below and based my recommendations to the patient on those findings.    XR HAND RIGHT (MIN 3 VIEWS)    Result Date: 1/30/2025  EXAMINATION: THREE XRAY VIEWS OF THE RIGHT HAND 1/30/2025 3:45 pm COMPARISON: None. HISTORY: ORDERING SYSTEM PROVIDED HISTORY: possible splinter right thumb, palmar TECHNOLOGIST PROVIDED HISTORY: Reason for exam:->possible splinter right thumb, palmar FINDINGS: The hand appears unremarkable. The metacarpals and the phalanges appear normal with no fractures. Joint spaces are well maintained. Soft tissues appear unremarkable.     No radiopaque foreign body.  If there is still concern about a foreign body in the soft tissues, ultrasound recommended.         EKG:   Read by ER in the absence of a Cardiologist shows      Assessment:   Principal Problem:    Infected abrasion of right thumb  Active Problems:    Status post incision and drainage    Infected abrasion of right thumb, initial encounter  Resolved Problems:    * No resolved hospital problems. *      Plan:       Flexor tendon sheath infection - suppurative flexor tenosynovitis of the right thumb   - Status post incision and drainage 2/03/2025  - Hand Surgery is following  - Surgical pathology right thumb foreign body splinter pending  - Elevate right extremity 23 hours/day  - Infectious Disease consulted for antibiotic management.     Thumb pain, right   - Will provide prn Morphine and/or

## 2025-02-03 NOTE — TELEPHONE ENCOUNTER
General Question     Subject: Patient is calling he just wanted to know if it is necessary for him to get and Pre-Op Physical today before his surgery at 3:05 pm? Patient would just like a call back before he comes for his surgery today?  PatientAppPerry leon   Contact Number: [phone]

## 2025-02-03 NOTE — ANESTHESIA PRE PROCEDURE
[facility] Department of Anesthesiology  Pre-Anesthesia Evaluation/Consultation       Name:  Perry Ramirez  : 1995  Age:  29 y.o.                                           MRN:  [id number]  Date: 2/3/2025           Surgeon: Surgeon(s):  Fuentes Kowalski MD    Procedure: Procedure(s):  INCISION AND DRAINAGE RIGHT THUMB     No Known Allergies  Patient Active Problem List   Diagnosis    Infected abrasion of right thumb     History reviewed. No pertinent past medical history.  History reviewed. No pertinent surgical history.  Social History     Tobacco Use    Smoking status: Never    Smokeless tobacco: Never   Vaping Use    Vaping status: Never Used   Substance Use Topics    Alcohol use: Never    Drug use: Never     Medications  No current facility-administered medications on file prior to encounter.     Current Outpatient Medications on File Prior to Encounter   Medication Sig Dispense Refill    cephALEXin (KEFLEX) 500 MG capsule Take 1 capsule by mouth 4 times daily for 7 days 28 capsule 0    methylPREDNISolone (MEDROL, LISETTE,) 4 MG tablet Take by mouth. 1 kit 0     No current facility-administered medications for this encounter.     Vital Signs (Current)   There were no vitals filed for this visit.                                         Vital Signs Statistics (for past 48 hrs)     Resp  Av  Min: 14   Min taken time: 25 0856  Max: 14   Max taken time: 25 0856  BP Readings from Last 3 Encounters:   25 123/83   24 113/79   23 125/75       BMI  There is no height or weight on file to calculate BMI.  Estimated body mass index is 24.11 kg/m² as calculated from the following:    Height as of an earlier encounter on 2/3/25: 1.778 m (5' 10\").    Weight as of an earlier encounter on 2/3/25: 76.2 kg (168 lb).    CBC   Lab Results   Component Value Date/Time    WBC 7.0 2023 03:42 PM    RBC 5.36 2023 03:42 PM    HGB 15.8 2023 03:42 PM    HCT 47.1 2023 03:42

## 2025-02-03 NOTE — OP NOTE
OPERATIVE REPORT          Patient:  Perry Ramirez    YOB: 1995  Date of Service:  2/3/2025    Location:  [facility] - Main OR        Preoperative Diagnoses:  Right  Thumb Flexor Tendon Sheath Infection - Suppurative Flexor Tenosynovitis     Postoperative Diagnoses:  Same    Procedures:   Incision & Drainage with Irrigation and Debridement of Right   Thumb Flexor Tendon Sheath Infection - Suppurative Flexor Tenosynovitis with Removal of Wooden foreign body    Surgeon:    Fuentes Kowalski MD    Surgical Assistant:    Hospital Supplied Assistant    Anesthesia:   General                                   Blood Loss:    Minimal         Complications:    None                          Tourniquet Time:   17 minutes      Indications: Mr. Perry Ramirez is a 29 y.o.  year old male with Right  Thumb Flexor Tendon Sheath Infection.   I have discussed preoperatively with him  the complications, limitations, expectations, alternatives and risk of the planned surgical care which he understood & all of his  questions were answered.  Mr. Perry Ramirez has provided written informed consent to proceed.    After written consent was obtained and the proper operative sites were identified and marked, Mr. Perry Ramirez was brought to the operating room and placed in the supine position on the operating room table with the Right arm extended upon a hand table. General anesthesia was induced & the Right upper extremity was prepped and draped in the usual sterile fashion.    Procedure:   After Gravity exsanguination, the pneuomo-tourniquet was inflated to 250 millimeters of Mercury about the arm.     The Right  Thumb was addressed with an oblique incision over the proximal aspect of the flexor tendon sheath.  Dissection was carried carefully through the soft tissues, carefully protecting the radial and ulnar neurovascular bundles throughout the procedure.  The soft tissues, surrounding the A-1 pulley,

## 2025-02-03 NOTE — FLOWSHEET NOTE
Pt arrived to PCU. Vitals listed below. Pt in bed in lowest position with call light within reach. Pt has no expressed needs at this time.     Electronically signed by Ashlie Ross RN on 2/3/25 at 5:55 PM EST    02/03/25 1745   Vital Signs   Temp 98.1 °F (36.7 °C)   Temp Source Oral   Pulse 72   Heart Rate Source Monitor   Respirations 18   BP (!) 133/94   MAP (Calculated) 107   BP Location Left upper arm   BP Method Automatic   Patient Position Semi fowlers   Pain Assessment   Pain Assessment 0-10   Pain Level 2   Pain Location Hand  (thumb)   Pain Orientation Right   Oxygen Therapy   SpO2 97 %   O2 Device None (Room air)

## 2025-02-03 NOTE — PROGRESS NOTES
VSS on 2L NC. Pt rates pain 1/10 in right hand. Pt denies nausea. Report called to Charlene VARNER on 5W. All questions answered. Pt to room 3342 when transport available.

## 2025-02-03 NOTE — PROGRESS NOTES
Per Dr. Kowalski, pt to be admitted under hospitalist. Transfer center called. Nursing Supervisor aware for bed placement.

## 2025-02-03 NOTE — ANESTHESIA POSTPROCEDURE EVALUATION
Department of Anesthesiology  Postprocedure Note    Patient: Perry Ramirez  MRN: [id number]  YOB: 1995  Date of evaluation: 2/3/2025    Procedure Summary       Date: 02/03/25 Room / Location: [address]    Anesthesia Start: 1517 Anesthesia Stop: 1613    Procedure: INCISION AND DRAINAGE RIGHT THUMB (Right: Hand) Diagnosis:       Infection of thumb      (Infection of thumb [L08.9])    Surgeons: Fuentes Kowalski MD Responsible Provider: Prabhu Cross MD    Anesthesia Type: general ASA Status: 2            Anesthesia Type: No value filed.    Slade Phase I: Slade Score: 8    Slade Phase II:      Anesthesia Post Evaluation    Patient location during evaluation: PACU  Patient participation: complete - patient participated  Level of consciousness: awake and alert  Pain score: 2  Airway patency: patent  Nausea & Vomiting: no nausea and no vomiting  Cardiovascular status: blood pressure returned to baseline  Respiratory status: acceptable  Hydration status: euvolemic  Pain management: adequate    No notable events documented.

## 2025-02-03 NOTE — PROGRESS NOTES
Mr. Perry Ramirez is a 29 y.o. right handed man  who is seen today in Hand Surgical Consultation at the request of No primary care provider on file..    He is seen today regarding right Thumb symptoms beginning 4 days ago.  He reports  a previous history of injuring his right Thumb when he fell and a piece of wood went into his thumb.  He reports developing erythema, tenderness, and pain of his right Thumb Volar aspect. He was seen for Emergency evaluation elsewhere, radiographs were obtained & he has been immobilized.  By report, his  infection was not incised or otherwise drained, and he was prescribed oral antibiotics.   He reports severe pain located in the palmar area of the Thumb, no tenderness of the wrist or elbow.  He notes today, no neurologic symptoms in the Thumb. Symptoms are worsening over time.      I have today reviewed with Perry Ramirez the clinically relevant, past medical history, medications, allergies,  family history, social history, and Review Of Systems & I have documented any details relevant to today's presenting complaints in my history above.  Mr. Perry Ramirez's self-reported past medical history, medications, allergies,  family history, social history, and Review Of Systems have been scanned into the chart under the \"Media\" tab.    Physical Exam:  Mr. Perry Ramirez's most recent vitals:  Vitals  Respirations: 14  Height: 177.8 cm (5' 10\")  Weight - Scale: 76.2 kg (168 lb)    He is well nourished, oriented to person, place & time.  He demonstrates appropriate mood and affect as well as normal gait and station.    Skin: Abnormal Texture.  There is moderate pain located at the Volar aspect of the Thumb on the Right, normal on the Left.  There is no evidence of proximal spread or lymphangitis.  No other digit shows sign of infection bilaterally.  Digital range of motion is limited by pain in the Thumb on the Right, normal on the Left  Kanavel's Sign (for suppurative flexor

## 2025-02-03 NOTE — CONSULTS
Infectious Diseases Inpatient Consult Note      Reason for Consult: Right thumb tenosynovitis status post incision and drainage    Requesting Physician:  Dr. Alicia       Primary Care Physician:  No primary care provider on file.    History Obtained From:  Epic and pt     CHIEF COMPLAINT:   Right hand thumb infection    HISTORY OF PRESENT ILLNESS:  29 y.o. male with no major medical history admitted to the hospital secondary to right thumb infection.  Patient sustained injury to the right thumb with a supporting splinter from a furniture.  He was seen in the ED was placed on oral antibiotic with advised to follow-up with .  He was placed on oral cephalexin until seen by orthopedics.  Patient now admitted to the hospital status post incision and drainage of the right thumb noted to have flexor tenosynovitis operative operative cultures in process noted to have new splinter  and this been removed.          Past Medical History:    History reviewed. No pertinent past medical history.    Past Surgical History:    History reviewed. No pertinent surgical history.    Current Medications:    Outpatient Medications Marked as Taking for the 2/3/25 encounter (Hospital Encounter)   Medication Sig Dispense Refill    cephALEXin (KEFLEX) 500 MG capsule Take 1 capsule by mouth 4 times daily for 7 days 28 capsule 0       Allergies:  Patient has no known allergies.    Immunizations :   Immunization History   Administered Date(s) Administered    TDaP, ADACEL (age 10y-64y), BOOSTRIX (age 10y+), IM, 0.5mL 07/24/2024         Social History:     Social History     Tobacco Use    Smoking status: Never    Smokeless tobacco: Never   Vaping Use    Vaping status: Never Used   Substance Use Topics    Alcohol use: Never    Drug use: Never     Social History     Tobacco Use   Smoking Status Never   Smokeless Tobacco Never      Family History : no DVT no COPD        REVIEW OF SYSTEMS:      Constitutional:  negative for fevers,  edema  Musculoskeletal: normal range of motion, no joint swelling, deformity or tenderness  Integumentary: No rashes, no abnormal skin lesions, no petechiae  Neurologic: reflexes normal and symmetric, no cranial nerve deficit  Psych:  Orientation, sensorium, mood normal   Lines:IV  Right thumb and hand.  Dressing         DATA:    CBC:   Lab Results   Component Value Date    WBC 7.0 04/18/2023    HGB 15.8 04/18/2023    HCT 47.1 04/18/2023    MCV 87.9 04/18/2023     04/18/2023     RENAL:   Lab Results   Component Value Date    CREATININE 0.9 04/18/2023    BUN 8 04/18/2023     04/18/2023    K 3.9 04/18/2023     04/18/2023    CO2 27 04/18/2023     SED RATE: No results found for: \"SEDRATE\"  CK: No results found for: \"CKTOTAL\"  CRP: No results found for: \"CRP\"  Hepatic Function Panel:   Lab Results   Component Value Date/Time    ALKPHOS 58 04/18/2023 03:42 PM    ALT 22 04/18/2023 03:42 PM    AST 25 04/18/2023 03:42 PM    BILITOT 0.4 04/18/2023 03:42 PM     UA:  Lab Results   Component Value Date/Time    COLORU Yellow 04/18/2023 03:42 PM    CLARITYU Clear 04/18/2023 03:42 PM    GLUCOSEU Negative 04/18/2023 03:42 PM    BILIRUBINUR Negative 04/18/2023 03:42 PM    KETUA Negative 04/18/2023 03:42 PM    BLOODU Negative 04/18/2023 03:42 PM    PHUR 6.5 04/18/2023 03:42 PM    PROTEINU Negative 04/18/2023 03:42 PM    UROBILINOGEN 0.2 04/18/2023 03:42 PM    NITRU Negative 04/18/2023 03:42 PM    LEUKOCYTESUR Negative 04/18/2023 03:42 PM    URINETYPE NotGiven 04/18/2023 03:42 PM      Urine Microscopic: No results found for: \"LABCAST\", \"BACTERIA\", \"COMU\", \"HYALCAST\", \"WBCUA\", \"RBCUA\"  Urine Reflex to Culture:   Lab Results   Component Value Date/Time    URRFLXCULT Not Indicated 04/18/2023 03:42 PM         MICRO: cultures reviewed and updated by me       Blood Culture: No results found for: \"BC\", \"BLOODCULT2\"    Viral Culture:    Lab Results   Component Value Date/Time    COVID19 Not Detected 08/03/2021 09:21 PM

## 2025-02-04 PROBLEM — Z78.9 FAILURE OF OUTPATIENT TREATMENT: Status: ACTIVE | Noted: 2025-02-04

## 2025-02-04 PROBLEM — L08.9 INFECTION OF THUMB: Status: ACTIVE | Noted: 2025-02-04

## 2025-02-04 PROBLEM — M65.949 TENOSYNOVITIS OF FINGER AND HAND: Status: ACTIVE | Noted: 2025-02-04

## 2025-02-04 LAB
ANION GAP SERPL CALCULATED.3IONS-SCNC: 11 MMOL/L (ref 3–16)
BASOPHILS # BLD: 0 K/UL (ref 0–0.2)
BASOPHILS NFR BLD: 0.2 %
BUN SERPL-MCNC: 9 MG/DL (ref 7–20)
CALCIUM SERPL-MCNC: 9.6 MG/DL (ref 8.3–10.6)
CHLORIDE SERPL-SCNC: 101 MMOL/L (ref 99–110)
CO2 SERPL-SCNC: 26 MMOL/L (ref 21–32)
CREAT SERPL-MCNC: 0.9 MG/DL (ref 0.9–1.3)
DEPRECATED RDW RBC AUTO: 13.5 % (ref 12.4–15.4)
EOSINOPHIL # BLD: 0 K/UL (ref 0–0.6)
EOSINOPHIL NFR BLD: 0 %
GFR SERPLBLD CREATININE-BSD FMLA CKD-EPI: >90 ML/MIN/{1.73_M2}
GLUCOSE SERPL-MCNC: 108 MG/DL (ref 70–99)
HCT VFR BLD AUTO: 48.5 % (ref 40.5–52.5)
HGB BLD-MCNC: 16.1 G/DL (ref 13.5–17.5)
LYMPHOCYTES # BLD: 1.4 K/UL (ref 1–5.1)
LYMPHOCYTES NFR BLD: 9.7 %
MCH RBC QN AUTO: 29.2 PG (ref 26–34)
MCHC RBC AUTO-ENTMCNC: 33.2 G/DL (ref 31–36)
MCV RBC AUTO: 88 FL (ref 80–100)
MONOCYTES # BLD: 0.7 K/UL (ref 0–1.3)
MONOCYTES NFR BLD: 4.8 %
NEUTROPHILS # BLD: 12 K/UL (ref 1.7–7.7)
NEUTROPHILS NFR BLD: 85.3 %
PLATELET # BLD AUTO: 192 K/UL (ref 135–450)
PMV BLD AUTO: 10 FL (ref 5–10.5)
POTASSIUM SERPL-SCNC: 4.3 MMOL/L (ref 3.5–5.1)
RBC # BLD AUTO: 5.51 M/UL (ref 4.2–5.9)
SODIUM SERPL-SCNC: 138 MMOL/L (ref 136–145)
WBC # BLD AUTO: 14 K/UL (ref 4–11)

## 2025-02-04 PROCEDURE — 6360000002 HC RX W HCPCS: Performed by: INTERNAL MEDICINE

## 2025-02-04 PROCEDURE — 2500000003 HC RX 250 WO HCPCS: Performed by: INTERNAL MEDICINE

## 2025-02-04 PROCEDURE — 36415 COLL VENOUS BLD VENIPUNCTURE: CPT

## 2025-02-04 PROCEDURE — 99233 SBSQ HOSP IP/OBS HIGH 50: CPT | Performed by: INTERNAL MEDICINE

## 2025-02-04 PROCEDURE — 1200000000 HC SEMI PRIVATE

## 2025-02-04 PROCEDURE — 80048 BASIC METABOLIC PNL TOTAL CA: CPT

## 2025-02-04 PROCEDURE — 6370000000 HC RX 637 (ALT 250 FOR IP): Performed by: INTERNAL MEDICINE

## 2025-02-04 PROCEDURE — 85025 COMPLETE CBC W/AUTO DIFF WBC: CPT

## 2025-02-04 PROCEDURE — 2580000003 HC RX 258: Performed by: INTERNAL MEDICINE

## 2025-02-04 RX ORDER — OXYCODONE HYDROCHLORIDE 5 MG/1
5 TABLET ORAL EVERY 4 HOURS PRN
Status: DISCONTINUED | OUTPATIENT
Start: 2025-02-04 | End: 2025-02-05 | Stop reason: HOSPADM

## 2025-02-04 RX ADMIN — PIPERACILLIN AND TAZOBACTAM 3375 MG: 3; .375 INJECTION, POWDER, LYOPHILIZED, FOR SOLUTION INTRAVENOUS at 04:58

## 2025-02-04 RX ADMIN — LINEZOLID 600 MG: 600 INJECTION, SOLUTION INTRAVENOUS at 08:49

## 2025-02-04 RX ADMIN — PIPERACILLIN AND TAZOBACTAM 3375 MG: 3; .375 INJECTION, POWDER, LYOPHILIZED, FOR SOLUTION INTRAVENOUS at 20:13

## 2025-02-04 RX ADMIN — PIPERACILLIN AND TAZOBACTAM 3375 MG: 3; .375 INJECTION, POWDER, LYOPHILIZED, FOR SOLUTION INTRAVENOUS at 13:06

## 2025-02-04 RX ADMIN — MORPHINE SULFATE 4 MG: 4 INJECTION, SOLUTION INTRAMUSCULAR; INTRAVENOUS at 09:49

## 2025-02-04 RX ADMIN — SODIUM CHLORIDE, PRESERVATIVE FREE 10 ML: 5 INJECTION INTRAVENOUS at 20:10

## 2025-02-04 RX ADMIN — OXYCODONE 5 MG: 5 TABLET ORAL at 08:44

## 2025-02-04 ASSESSMENT — PAIN DESCRIPTION - ORIENTATION
ORIENTATION: RIGHT
ORIENTATION: RIGHT

## 2025-02-04 ASSESSMENT — PAIN DESCRIPTION - DESCRIPTORS
DESCRIPTORS: ACHING
DESCRIPTORS: THROBBING

## 2025-02-04 ASSESSMENT — PAIN SCALES - GENERAL
PAINLEVEL_OUTOF10: 10
PAINLEVEL_OUTOF10: 6

## 2025-02-04 ASSESSMENT — PAIN DESCRIPTION - LOCATION
LOCATION: HAND
LOCATION: HAND

## 2025-02-04 ASSESSMENT — PAIN - FUNCTIONAL ASSESSMENT
PAIN_FUNCTIONAL_ASSESSMENT: ACTIVITIES ARE NOT PREVENTED
PAIN_FUNCTIONAL_ASSESSMENT: ACTIVITIES ARE NOT PREVENTED

## 2025-02-04 NOTE — CARE COORDINATION
Case Management Assessment  Initial Evaluation    Date/Time of Evaluation: 2/4/2025 12:52 PM  Assessment Completed by: LAURA Allen, STEFFANIE    If patient is discharged prior to next notation, then this note serves as note for discharge by case management.    Patient Name: Perry Ramirez                   YOB: 1995  Diagnosis: Infection of thumb [L08.9]  Status post incision and drainage [Z98.890]  Infected abrasion of right thumb, initial encounter [S60.311A, L08.9]                   Date / Time: 2/3/2025  2:48 PM    Patient Admission Status: Inpatient   Readmission Risk (Low < 19, Mod (19-27), High > 27): Readmission Risk Score: 2.6    Current PCP: No primary care provider on file.  PCP verified by CM? Yes    Chart Reviewed: Yes      History Provided by: Patient  Patient Orientation: Alert and Oriented    Patient Cognition: Alert    Hospitalization in the last 30 days (Readmission):  No    If yes, Readmission Assessment in CM Navigator will be completed.    Advance Directives:      Code Status: Full Code   Patient's Primary Decision Maker is: Legal Next of Kin      Discharge Planning:    Patient lives with: Spouse/Significant Other, Children (Ages 6, 5, 4) Type of Home: Apartment  Primary Care Giver: Self  Patient Support Systems include: Spouse/Significant Other, Children, Family Members   Current Financial resources: None  Current community resources: None  Current services prior to admission: Durable Medical Equipment            Current DME: Cane            Type of Home Care services:  None    ADLS  Prior functional level: Independent in ADLs/IADLs  Current functional level: Independent in ADLs/IADLs    PT AM-PAC:   /24  OT AM-PAC:   /24    Family can provide assistance at DC: Yes  Would you like Case Management to discuss the discharge plan with any other family members/significant others, and if so, who? Yes (girlfriend if needed.)  Plans to Return to Present Housing: Yes  Other Identified

## 2025-02-04 NOTE — PROGRESS NOTES
Infectious Diseases Inpatient Progress Note      CHIEF COMPLAINT:    Right hand thumb infection  Tenosynovitis  Wood splinter  Right hand pain  Haemophilus influenzae infection  Group F strep infection    HISTORY OF PRESENT ILLNESS:  29 y.o. male with no major medical history admitted to the hospital secondary to right thumb infection.  Patient sustained injury to the right thumb with a supporting splinter from a furniture.  He was seen in the ED was placed on oral antibiotic with advised to follow-up with .  He was placed on oral cephalexin until seen by orthopedics.  Patient now admitted to the hospital status post incision and drainage of the right thumb noted to have flexor tenosynovitis operative operative cultures in process noted to have new splinter  and this been removed.      Interval history: Complains of ongoing right thumb pain dressing intact drain removed operative culture positive for haemophilus parainfluenza and group F strep WBC elevation noted    Past Medical History:    History reviewed. No pertinent past medical history.    Past Surgical History:    Past Surgical History:   Procedure Laterality Date    ARM SURGERY Right 2/3/2025    INCISION AND DRAINAGE RIGHT THUMB performed by Fuentes Kowalski MD at [facility] OR       Current Medications:    Outpatient Medications Marked as Taking for the 2/3/25 encounter (Hospital Encounter)   Medication Sig Dispense Refill    cephALEXin (KEFLEX) 500 MG capsule Take 1 capsule by mouth 4 times daily for 7 days 28 capsule 0       Allergies:  Patient has no known allergies.    Immunizations :   Immunization History   Administered Date(s) Administered    TDaP, ADACEL (age 10y-64y), BOOSTRIX (age 10y+), IM, 0.5mL 07/24/2024         Social History:     Social History     Tobacco Use    Smoking status: Never    Smokeless tobacco: Never   Vaping Use    Vaping status: Never Used   Substance Use Topics    Alcohol use: Never    Drug use: Never     Social  History     Tobacco Use   Smoking Status Never   Smokeless Tobacco Never      Family History : no DVT no COPD        REVIEW OF SYSTEMS:      Constitutional:  negative for fevers, chills, night sweats  Eyes:  negative for blurred vision, eye discharge, visual disturbance   HEENT:  negative for hearing loss, ear drainage,nasal congestion  Respiratory:  negative for cough, shortness of breath or hemoptysis   Cardiovascular:  negative for chest pain, palpitations, syncope  Gastrointestinal:  negative for nausea, vomiting, diarrhea, constipation, abdominal pain  Genitourinary:  negative for frequency, dysuria, urinary incontinence, hematuria  Hematologic/Lymphatic:  negative for easy bruising, bleeding and lymphadenopathy  Allergic/Immunologic:  negative for recurrent infections, angioedema, anaphylaxis   Endocrine:  negative for weight changes, polyuria, polydipsia and polyphagia  Musculoskeletal: Right hand thumb swelling redness t  pain, swelling, decreased range of motion  Integumentary: No rashes, skin lesions  Neurological:  negative for headaches, slurred speech, unilateral weakness  Psychiatric: negative for hallucinations,confusion,agitation.     PHYSICAL EXAM:      Vitals:    BP (!) 133/91   Pulse 76   Temp 98.5 °F (36.9 °C) (Oral)   Resp 18   Ht 1.778 m (5' 10\")   Wt 77.7 kg (171 lb 4.8 oz)   SpO2 96%   BMI 24.58 kg/m²     General Appearance: alert,in no acute distress, no pallor, no icterus   Skin: warm and dry, no rash or erythema  Head: normocephalic and atraumatic  Eyes: pupils equal, round, and reactive to light, conjunctivae normal  ENT: tympanic membrane, external ear and ear canal normal bilaterally, nose without deformity, nasal mucosa and turbinates normal without polyps  Neck: supple and non-tender without mass, no thyromegaly  no cervical lymphadenopathy  Pulmonary/Chest: clear to auscultation bilaterally- no wheezes, rales or rhonchi, normal air movement, no respiratory

## 2025-02-04 NOTE — PROGRESS NOTES
4 Eyes Skin Assessment     NAME:  Perry Ramirez  YOB: 1995  MEDICAL RECORD NUMBER:  [id number]    The patient is being assessed for  Admission    I agree that at least one RN has performed a thorough Head to Toe Skin Assessment on the patient. ALL assessment sites listed below have been assessed.      Areas assessed by both nurses:    Head, Face, Ears, Shoulders, Back, Chest, Arms, Elbows, Hands, Sacrum. Buttock, Coccyx, Ischium, Legs. Feet and Heels, and Under Medical Devices         Does the Patient have a Wound? Yes wound(s) were present on assessment. LDA wound assessment was Initiated and completed by RN       Reij Prevention initiated by RN: Yes  Wound Care Orders initiated by RN: No    Pressure Injury (Stage 3,4, Unstageable, DTI, NWPT, and Complex wounds) if present, place Wound referral order by RN under : No    New Ostomies, if present place, Ostomy referral order under : No     Nurse 1 eSignature: Electronically signed by PARISA CARBONE RN on 2/3/25 at 7:49 PM EST    **SHARE this note so that the co-signing nurse can place an eSignature**    Nurse 2 eSignature: {Esignature:[id number]}

## 2025-02-04 NOTE — PROGRESS NOTES
[facility] Orthopedic Surgery   Progress Note      S/P :  SUBJECTIVE  In bed. Alert and oriented. Pain is   described in right thumb and with the intensity of moderate to severe. Has been elevating right hand as instructed. Soaked right thumb this AM per nursing. . Pain is described as throbbing.       OBJECTIVE              Physical                      VITALS:  BP (!) 133/91   Pulse 76   Temp 98.5 °F (36.9 °C) (Oral)   Resp 18   Ht 1.778 m (5' 10\")   Wt 77.7 kg (171 lb 4.8 oz)   SpO2 96%   BMI 24.58 kg/m²                     MUSCULOSKELETAL:  right thumb with 2 notable suture lines, one distal, one proximal with open wound over IP joint on palmar side. Hnad was soaked in warm water with Hibilclens then packing is removed from right thumb IP joint. REdressed with Adaptic then gauze pad then gauze roll then ACE,. Pt tolerated well but did complaint of severe pain and throbbing post procedure.                    NEUROLOGIC:                                  Sensory:  Touch:  Right Upper Extremity:  normal                                        Data       CBC:   Lab Results   Component Value Date/Time    WBC 14.0 02/04/2025 06:00 AM    RBC 5.51 02/04/2025 06:00 AM    HGB 16.1 02/04/2025 06:00 AM    HCT 48.5 02/04/2025 06:00 AM    MCV 88.0 02/04/2025 06:00 AM    MCH 29.2 02/04/2025 06:00 AM    MCHC 33.2 02/04/2025 06:00 AM    RDW 13.5 02/04/2025 06:00 AM     02/04/2025 06:00 AM    MPV 10.0 02/04/2025 06:00 AM        WBC:    Lab Results   Component Value Date/Time    WBC 14.0 02/04/2025 06:00 AM        Hemoglobin/Hematocrit:    Lab Results   Component Value Date/Time    HGB 16.1 02/04/2025 06:00 AM    HCT 48.5 02/04/2025 06:00 AM        PT/INR:  No results found for: \"PROTIME\", \"INR\"           Current Inpatient Medications             Current Facility-Administered Medications: sodium chloride flush 0.9 % injection 10 mL, 10 mL, IntraVENous, 2 times per day  sodium chloride flush 0.9 % injection 10 mL, 10 mL,

## 2025-02-04 NOTE — PLAN OF CARE
Problem: Discharge Planning  Goal: Discharge to home or other facility with appropriate resources  2/4/2025 1200 by Charlene Arana, RN  Outcome: Progressing     Problem: Pain  Goal: Verbalizes/displays adequate comfort level or baseline comfort level  2/4/2025 1200 by Charlene Arana, RN  Outcome: Progressing

## 2025-02-04 NOTE — PROGRESS NOTES
Spiritual Health History and Assessment/Progress Note  [facility]    (P) Initial Encounter, Spiritual/Emotional Needs,  ,  ,      Name: Perry Ramirez MRN: [id number]    Age: 29 y.o.     Sex: male   Language: English   Zoroastrian: None   Infected abrasion of right thumb     Date: 2/4/2025            Total Time Calculated: (P) 15 min              Spiritual Assessment began in [facility]W PROGRESSIVE CARE            Encounter Overview/Reason: (P) Initial Encounter, Spiritual/Emotional Needs  Service Provided For: (P) Patient    Radha, Belief, Meaning:   Patient Other: Pt finds peace/meaning in experience his mother of good when bad things are occurring in his life.  Family/Friends No family/friends present      Importance and Influence:  Patient unable to assess at this time  Family/Friends No family/friends present    Community:  Patient feels well-supported. Support system includes: Other: Sibling, partner  Family/Friends Other: unknown    Assessment and Plan of Care:   Patient Interventions include: PT shared narrative of feelings of being in hospital. Space for Pt to share narrative of experiencing peace with experiencing his mother in bad situations.  concluded visit, pts family at bedside.   Family/Friends Interventions include: unknown    Patient Plan of Care: Other: No further visits planned, pt will contact  if interested  Family/Friends Plan of Care: No family/friends present    Electronically signed by BRADLEY Suero on 2/4/2025 at 9:43 AM

## 2025-02-04 NOTE — PROGRESS NOTES
Slept off and on throughout night shift. Appears anxious about hospitalization in general. Kept RUE elevated in sling setup throughout night shift, tolerated fairly well. 1x episode of breakthrough pain that occurred from jerking hand when waking up from sleep, PRN morphine and ice provided relief. Ambulates to bathroom, tolerates well. In bed, call light in reach.

## 2025-02-04 NOTE — PROGRESS NOTES
Hospitalist   Progress Note    Patient Name: Perry Ramirez  PCP: No primary care provider on file.  Date of Admission: 2/3/2025    Chief Complaint on Admission: Pt is s/p I&D for right thumb infection   Chief diagnosis after evaluation: Flexor tendon sheath infection - suppurative flexor tenosynovitis of the right thumb     Brief Synopsis: Patient is a 29 y.o. man who has no past medical history on file. who was admitted on 2/3/2025 for evaluation and treatment of Flexor tendon sheath infection - suppurative flexor tenosynovitis of the right thumb     Pt Seen/Examined and Chart Reviewed.     Subjective: Pt is feeling better and has no new complaints. Possible discharge tomorrow per Ortho    Objective:  Allergies  Patient has no known allergies.    Medications    Scheduled Meds:   sodium chloride flush  10 mL IntraVENous 2 times per day    piperacillin-tazobactam  3,375 mg IntraVENous Q8H    linezolid  600 mg IntraVENous BID     Infusions:   sodium chloride       PRN Meds:  oxyCODONE **OR** [DISCONTINUED] oxyCODONE, sodium chloride flush, sodium chloride, ondansetron, polyethylene glycol, acetaminophen **OR** acetaminophen    Physical    VITALS:  BP (!) 133/91   Pulse 76   Temp 98.5 °F (36.9 °C) (Oral)   Resp 18   Ht 1.778 m (5' 10\")   Wt 77.7 kg (171 lb 4.8 oz)   SpO2 96%   BMI 24.58 kg/m²   CONSTITUTIONAL:  WD/WN 29 y.o. year-old male who is awake, alert, cooperative, no apparent distress, and appears stated age  EYES:  Lids and lashes normal, PERRL, EOMI, sclera clear, conjunctiva normal  ENT:  NC/AT, MMM    NECK:  Supple, symmetrical, trachea midline, no adenopathy  HEMATOLOGIC/LYMPHATICS:  no cervical, supraclavicular or axillary lymphadenopathy  LUNGS:  clear to auscultation bilaterally, No increased work of breathing, good air exchange, no crackles or wheezing  CARDIOVASCULAR:  Regular rate and rhythm, normal S1 and S2, no S3 or S4, and no significant murmurs, rubs or gallops noted. Normal apical  impulse.   ABDOMEN:  Normal active bowel sounds, soft, non-tender, non-distended, no masses palpated, no organomegally  EXTREMITIES.  Right arm is being elevated. Right hand is bandaged. Otherwise extremities atraumatic, no cyanosis or edema and Homans sign is negative, no sign of DVT.   MENTAL STATUS: Awake, alert, oriented to name, place and time.    NEUROLOGIC:  Cranial nerves II-XII are grossly intact.        Data    CBC with Differential:    Lab Results   Component Value Date/Time    WBC 14.0 02/04/2025 06:00 AM    HGB 16.1 02/04/2025 06:00 AM    HCT 48.5 02/04/2025 06:00 AM     02/04/2025 06:00 AM    MCV 88.0 02/04/2025 06:00 AM    RDW 13.5 02/04/2025 06:00 AM    LYMPHOPCT 9.7 02/04/2025 06:00 AM    MONOPCT 4.8 02/04/2025 06:00 AM    EOSPCT 0.0 02/04/2025 06:00 AM    BASOPCT 0.2 02/04/2025 06:00 AM    MONOSABS 0.7 02/04/2025 06:00 AM    LYMPHSABS 1.4 02/04/2025 06:00 AM    EOSABS 0.0 02/04/2025 06:00 AM    BASOSABS 0.0 02/04/2025 06:00 AM     BMP:    Lab Results   Component Value Date/Time     02/04/2025 06:00 AM    K 4.3 02/04/2025 06:00 AM     02/04/2025 06:00 AM    CO2 26 02/04/2025 06:00 AM    BUN 9 02/04/2025 06:00 AM    CREATININE 0.9 02/04/2025 06:00 AM    GLUCOSE 108 02/04/2025 06:00 AM    CALCIUM 9.6 02/04/2025 06:00 AM    LABGLOM >90 02/04/2025 06:00 AM    LABGLOM >60 04/18/2023 03:42 PM     LFT:   Lab Results   Component Value Date/Time    ALKPHOS 52 02/03/2025 06:50 PM    ALT 23 02/03/2025 06:50 PM    AST 31 02/03/2025 06:50 PM    BILITOT <0.2 02/03/2025 06:50 PM     Magnesium:  No results found for: \"MG\"  Phosphorus:  No results found for: \"PHOS\"  PT/INR:  No results found for: \"PTINR\"  U/A:    Lab Results   Component Value Date/Time    LEUKOCYTESUR Negative 04/18/2023 03:42 PM    UROBILINOGEN 0.2 04/18/2023 03:42 PM    BILIRUBINUR Negative 04/18/2023 03:42 PM    BLOODU Negative 04/18/2023 03:42 PM    GLUCOSEU Negative 04/18/2023 03:42 PM    PROTEINU Negative 04/18/2023 03:42 PM

## 2025-02-04 NOTE — PLAN OF CARE
Problem: Discharge Planning  Goal: Discharge to home or other facility with appropriate resources  Outcome: Progressing  Flowsheets (Taken 2/3/2025 2021)  Discharge to home or other facility with appropriate resources: Identify barriers to discharge with patient and caregiver     Problem: Pain  Goal: Verbalizes/displays adequate comfort level or baseline comfort level  Outcome: Progressing

## 2025-02-05 VITALS
RESPIRATION RATE: 18 BRPM | SYSTOLIC BLOOD PRESSURE: 140 MMHG | WEIGHT: 170.64 LBS | OXYGEN SATURATION: 100 % | HEART RATE: 69 BPM | HEIGHT: 70 IN | BODY MASS INDEX: 24.43 KG/M2 | TEMPERATURE: 97.9 F | DIASTOLIC BLOOD PRESSURE: 92 MMHG

## 2025-02-05 LAB
BASOPHILS # BLD: 0 K/UL (ref 0–0.2)
BASOPHILS NFR BLD: 0.4 %
DEPRECATED RDW RBC AUTO: 14 % (ref 12.4–15.4)
EOSINOPHIL # BLD: 0 K/UL (ref 0–0.6)
EOSINOPHIL NFR BLD: 0.5 %
HCT VFR BLD AUTO: 45.3 % (ref 40.5–52.5)
HGB BLD-MCNC: 15 G/DL (ref 13.5–17.5)
LYMPHOCYTES # BLD: 2.3 K/UL (ref 1–5.1)
LYMPHOCYTES NFR BLD: 25.9 %
MCH RBC QN AUTO: 29.2 PG (ref 26–34)
MCHC RBC AUTO-ENTMCNC: 33.1 G/DL (ref 31–36)
MCV RBC AUTO: 88.2 FL (ref 80–100)
MONOCYTES # BLD: 0.5 K/UL (ref 0–1.3)
MONOCYTES NFR BLD: 6 %
NEUTROPHILS # BLD: 5.9 K/UL (ref 1.7–7.7)
NEUTROPHILS NFR BLD: 67.2 %
PLATELET # BLD AUTO: 188 K/UL (ref 135–450)
PMV BLD AUTO: 9.7 FL (ref 5–10.5)
RBC # BLD AUTO: 5.13 M/UL (ref 4.2–5.9)
WBC # BLD AUTO: 8.7 K/UL (ref 4–11)

## 2025-02-05 PROCEDURE — 6360000002 HC RX W HCPCS: Performed by: INTERNAL MEDICINE

## 2025-02-05 PROCEDURE — 94760 N-INVAS EAR/PLS OXIMETRY 1: CPT

## 2025-02-05 PROCEDURE — 26020 DRAIN HAND TENDON SHEATH: CPT | Performed by: ORTHOPAEDIC SURGERY

## 2025-02-05 PROCEDURE — 85025 COMPLETE CBC W/AUTO DIFF WBC: CPT

## 2025-02-05 PROCEDURE — 36415 COLL VENOUS BLD VENIPUNCTURE: CPT

## 2025-02-05 PROCEDURE — 2580000003 HC RX 258: Performed by: INTERNAL MEDICINE

## 2025-02-05 PROCEDURE — 20525 RMVL FB MUSC/TDN DEEP/COMP: CPT | Performed by: ORTHOPAEDIC SURGERY

## 2025-02-05 RX ORDER — OXYCODONE HYDROCHLORIDE 5 MG/1
5 TABLET ORAL EVERY 6 HOURS PRN
Qty: 12 TABLET | Refills: 0 | Status: SHIPPED | OUTPATIENT
Start: 2025-02-05 | End: 2025-02-08

## 2025-02-05 RX ADMIN — PIPERACILLIN AND TAZOBACTAM 3375 MG: 3; .375 INJECTION, POWDER, LYOPHILIZED, FOR SOLUTION INTRAVENOUS at 04:19

## 2025-02-05 RX ADMIN — PIPERACILLIN AND TAZOBACTAM 3375 MG: 3; .375 INJECTION, POWDER, LYOPHILIZED, FOR SOLUTION INTRAVENOUS at 12:35

## 2025-02-05 NOTE — DISCHARGE SUMMARY
Hospitalist Discharge Summary     Perry Ramirez  : 1995  MRN: [id number]    Admit date: 2/3/2025  Discharge date: 2025    Admitting Physician: Manolo Alicia MD    Discharge Diagnoses:   Patient Active Problem List   Diagnosis    Infected abrasion of right thumb    Status post incision and drainage    Infected abrasion of right thumb, initial encounter    Tenosynovitis of finger and hand    Failure of outpatient treatment    Infection of thumb       Admission Condition: serious    Discharged Condition: stable    Discharge Exam:  VITALS:  BP (!) 140/92   Pulse 69   Temp 97.9 °F (36.6 °C) (Oral)   Resp 18   Ht 1.778 m (5' 10\")   Wt 77.4 kg (170 lb 10.2 oz)   SpO2 100%   BMI 24.48 kg/m²   CONSTITUTIONAL:  WD/WN 29 y.o. year-old male who is awake, alert, cooperative, no apparent distress, and appears stated age  EYES:  Lids and lashes normal, PERRL, EOMI, sclera clear, conjunctiva normal  ENT:  NC/AT, MMM    NECK:  Supple, symmetrical, trachea midline, no adenopathy  HEMATOLOGIC/LYMPHATICS:  no cervical, supraclavicular or axillary lymphadenopathy  LUNGS:  clear to auscultation bilaterally, No increased work of breathing, good air exchange, no crackles or wheezing  CARDIOVASCULAR:  Regular rate and rhythm, normal S1 and S2, no S3 or S4, and no significant murmurs, rubs or gallops noted. Normal apical impulse.   ABDOMEN:  Normal active bowel sounds, soft, non-tender, non-distended, no masses palpated, no organomegally  EXTREMITIES.  Right arm is being elevated. Right hand is bandaged. Otherwise extremities atraumatic, no cyanosis or edema and Homans sign is negative, no sign of DVT.   MENTAL STATUS: Awake, alert, oriented to name, place and time.    NEUROLOGIC:  Cranial nerves II-XII are grossly intact.        Hospital Course:     Chief Complaint on Admission: Pt is s/p I&D for right thumb infection   Chief diagnosis after evaluation: Flexor tendon sheath infection - suppurative flexor

## 2025-02-05 NOTE — PLAN OF CARE
Problem: Discharge Planning  Goal: Discharge to home or other facility with appropriate resources  2/4/2025 2154 by Mone Lloyd, RN  Outcome: Progressing  2/4/2025 1200 by Charlene Arana RN  Outcome: Progressing     Problem: Pain  Goal: Verbalizes/displays adequate comfort level or baseline comfort level  2/4/2025 2154 by Mone Lloyd, RN  Outcome: Progressing  2/4/2025 1200 by Charlene Arana, RN  Outcome: Progressing

## 2025-02-05 NOTE — DISCHARGE INSTRUCTIONS
Call for increase in pain or swelling in operative extremity. Call for loss of movement or sensation in operative extremity. Call for fever over 101 F.    Elevate right hand higher than level or heart to reduce pain and swelling  DAILY:  Soak right hand in warm water and Hibiclens for 5-10 min then rewrap in nonstick gauze, then place dry gauze pad on top. Secure with gauze roll then ACE. (Supplies issued )  Followup Dr PIERRE Kowalski in office in one week on 2/11 or 2/12. Call to make appointment  Continue oral antibiotic as ordered.   [facility], [address], [address]   [id number],   [phone]

## 2025-02-05 NOTE — PROGRESS NOTES
Discharge orders acknowledged by RN . Discharge teaching completed with pt and family. AVS reviewed and all questions answered. Medication regimen reviewed and pt understands schedule. MedsToBeds received. Follow up appointments also reviewed with pt and resources given for discharge. IV removed. Bedside monitor removed from pt. Pt vitals WNL. Pt discharged with all belongings to home with self. Pt transported off of unit via wheelchair and was escorted to car. No complications.

## 2025-02-05 NOTE — PROGRESS NOTES
[facility]   Progress Note      S/P :  SUBJECTIVE  IN bed on phone., Alert and oriented. . Pain is   described in right thumb and with the intensity of moderate. Pain is described as aching. Pain is better than yesterday he says.       OBJECTIVE              Physical                      VITALS:  BP (!) 140/92   Pulse 69   Temp 97.9 °F (36.6 °C) (Oral)   Resp 18   Ht 1.778 m (5' 10\")   Wt 77.4 kg (170 lb 10.2 oz)   SpO2 100%   BMI 24.48 kg/m²                     MUSCULOSKELETAL:  right thumb warm and pink. No notable swelling, mild erythema at suture sites right thumb. Able to wiggle right thumb at base. Limited at right thumb IP due to pain                    NEUROLOGIC:                                  Sensory:  Touch:  Right Upper Extremity:  normal                                                 Surgical wound appears clean and dry right thumb. Sutures intact. Soaked right hand as ordered then dressed. Pt taught soak and dressing process and verbalized understanding. Supplies issued.     Data       CBC:   Lab Results   Component Value Date/Time    WBC 8.7 02/05/2025 04:15 AM    RBC 5.13 02/05/2025 04:15 AM    HGB 15.0 02/05/2025 04:15 AM    HCT 45.3 02/05/2025 04:15 AM    MCV 88.2 02/05/2025 04:15 AM    MCH 29.2 02/05/2025 04:15 AM    MCHC 33.1 02/05/2025 04:15 AM    RDW 14.0 02/05/2025 04:15 AM     02/05/2025 04:15 AM    MPV 9.7 02/05/2025 04:15 AM        WBC:    Lab Results   Component Value Date/Time    WBC 8.7 02/05/2025 04:15 AM        Hemoglobin/Hematocrit:    Lab Results   Component Value Date/Time    HGB 15.0 02/05/2025 04:15 AM    HCT 45.3 02/05/2025 04:15 AM        PT/INR:  No results found for: \"PROTIME\", \"INR\"           Current Inpatient Medications             Current Facility-Administered Medications: piperacillin-tazobactam (ZOSYN) 3,375 mg in sodium chloride 0.9 % 50 mL IVPB (mini-bag), 3,375 mg, IntraVENous, Q8H  oxyCODONE (ROXICODONE) immediate release tablet 5 mg, 5

## 2025-02-05 NOTE — PROGRESS NOTES
CLINICAL PHARMACY NOTE: MEDS TO BEDS    Total # of Prescriptions Filled: 2   The following medications were delivered to the patient:  Current Discharge Medication List        START taking these medications    Details   oxyCODONE (ROXICODONE) 5 MG immediate release tablet Take 1 tablet by mouth every 6 hours as needed for Pain for up to 3 days. Max Daily Amount: 20 mg  Qty: 12 tablet, Refills: 0    Comments: Reduce doses taken as pain becomes manageable  Associated Diagnoses: Tenosynovitis of finger and hand      amoxicillin-clavulanate (AUGMENTIN) 875-125 MG per tablet Take 1 tablet by mouth 2 times daily for 20 days  Qty: 40 tablet, Refills: 0               Additional Documentation:

## 2025-02-05 NOTE — PROGRESS NOTES
CLINICAL PHARMACY NOTE: MEDS TO BEDS       Medication Assistance Voucher  Patient: Perry Ramirez  YOB: 1995    Floor/Unit:[id number]  Discharge Date: 2/5/2025   Approver Name/Title:    Cost Center to be cross-charged: [id number]  Medications Approved : CLINICAL PHARMACY NOTE: MEDS TO BEDS    Total # of Prescriptions Filled: 1   The following medications were delivered to the patient:  Current Discharge Medication List        START taking these medications    Details              amoxicillin-clavulanate (AUGMENTIN) 875-125 MG per tablet Take 1 tablet by mouth 2 times daily for 20 days  Qty: 40 tablet, Refills: 0                 Additional Documentation:      By signing below, I attest that I have the authority to authorize medication assistance for the patient and medications listed above. I understand that the cost center listed above will be cross-charged for the total cost of the drugs dispensed.  Pharmacy Instructions   Bill authorized prescriptions listed above to third party plan “[facility]”  o ID: Approver’s last name  o Group: Cost center of the unit/department that authorized medication assistance   Retain this document for future reference

## 2025-02-08 LAB
BACTERIA SPEC AEROBE CULT: ABNORMAL
BACTERIA SPEC AEROBE CULT: NORMAL
BACTERIA SPEC ANAEROBE CULT: ABNORMAL
BACTERIA SPEC ANAEROBE CULT: NORMAL
GRAM STN SPEC: ABNORMAL
GRAM STN SPEC: NORMAL
ORGANISM: ABNORMAL

## 2025-02-11 ENCOUNTER — TELEPHONE (OUTPATIENT)
Dept: ORTHOPEDIC SURGERY | Age: 30
End: 2025-02-11

## 2025-02-11 NOTE — TELEPHONE ENCOUNTER
Spoke with the patient, states that his throat hurts and it feels like he is swallowing chips.  I let him know that his sore throat is not from his surgery or the infection that was in his thumb.  Patient states that the back of his throat looks bumpy and inflamed.  I told him that he needs to go see his PCP if he has one or got to urgent care.  Patient understood

## 2025-02-11 NOTE — TELEPHONE ENCOUNTER
General Question     Subject: POST OP CONCERNS  Patient and /or Facility Request: Perry Ramirez   Contact Number: [phone]    PT CALLED IN STATING THAT HE IS HAVING CONCERNS ABOUT HIS THROAT AFTER HIS SURGERY.    REACHED OUT TO TRIAGE

## 2025-02-11 NOTE — TELEPHONE ENCOUNTER
2/3/2025 INCISION AND DRAINAGE RIGHT THUMB       Patient calling in with complaints about his throat after sx, please advise patient would like a call back     [phone]- Perry

## 2025-02-17 ENCOUNTER — OFFICE VISIT (OUTPATIENT)
Dept: ORTHOPEDIC SURGERY | Age: 30
End: 2025-02-17

## 2025-02-17 VITALS — HEIGHT: 70 IN | WEIGHT: 170 LBS | RESPIRATION RATE: 14 BRPM | BODY MASS INDEX: 24.34 KG/M2

## 2025-02-17 DIAGNOSIS — L08.9 FINGER INFECTION: Primary | ICD-10-CM

## 2025-02-17 PROCEDURE — 99024 POSTOP FOLLOW-UP VISIT: CPT | Performed by: PHYSICIAN ASSISTANT

## 2025-02-17 NOTE — PROGRESS NOTES
Physician Progress Note      PATIENT:               YIMI LAW  [facility] #:                  [id number]  :                       1995  ADMIT DATE:       2/3/2025 2:48 PM  DISCH DATE:        2025 10:16 AM  RESPONDING  PROVIDER #:        ELDA MARIO APRSHANELLE - CNP          QUERY TEXT:    Dear Elda.    Patient admitted with right thumb flexor tendon sheath infection. Per Op note   dated 2/3 noted documentation of I&D with irrigation and debridement of r   thumb flexor tendon sheath with removal of wooden foreign body,  To accurately reflect the procedure performed please further specify the depth   of tissue incised and drained:    The medical record reflects the following:  Risk Factors: failure of outpatient treatment infection of r thumb  Clinical Indicators: Per report, from H/P dated 2/3, ' he developed erythema,   tenderness, and pain of the volar aspect of his right thumb on or about   2025 after a piece of wood went into his right thumb, His symptoms   worsened and he was seen by Hand Surgery and underwent an incision & drainage   with irrigation and debridement of his right thumb today, 2/3/2025'  Treatment: incision and debridement r thumb, CBC CMP, ID consult IV abx, and   soaks, right 3xd, elevate extremity in [place], f/u with MD x 1 week    Thank you  Elda López RN BSN CRCR  Clinical   [email or web address]  Options provided:  -- Addendum to 2/3 procedure note The depth of the drainage to right thumb   down to and including subcutaneous tissue  -- Addendum to 2/3 procedure note: This depth of the drainage to right thumb   was skin only.  -- Other - I will add my own diagnosis  -- Disagree - Not applicable / Not valid  -- Disagree - Clinically unable to determine / Unknown  -- Refer to Clinical Documentation Reviewer    PROVIDER RESPONSE TEXT:    Addendum to 2/3 procedure note The depth of the drainage to right thumb down   to and

## 2025-02-17 NOTE — PROGRESS NOTES
Mr. Perry Ramirez returns today in follow-up of his recent   Incision & Drainage with Irrigation and Debridement of Right  Thumb Flexor Tendon Sheath Infection - Suppurative Flexor Tenosynovitis with Removal of Wooden foreign body done approximately 2 weeks ago.    He has done well post-operatively and has noted varying discomfort and decreased swelling.  no symptoms of infection. He states that he is still on oral antibiotics.     He notes no symptoms of numbness, tingling, no symptoms related to perfusion.    Physical Exam:  Skin is healing well, without erythema, drainage or sign of infection. Sutures remain intact.   Digital range of motion is full and equal bilatearlly.  Thumb shows range of motion to be stiff from immobilization on the right.   Wrist range of motion is Full.  Sensation is normal in the Radial Digital Nerve and Ulnar Digital Nerve distribution of the Thumb.  Vascular examination reveals normal, good capillary refill, and good color.  Swelling is minimal in the injured digit(s).  There is no surrounding erythema, redness & induration.       Radiographic Evaluation:  Radiographs were not obtained today.    Impression:  Mr. Perry Ramirez is doing fairly well after recent   Incision & Drainage with Irrigation and Debridement of Right   Thumb Flexor Tendon Sheath Infection - Suppurative Flexor Tenosynovitis with Removal of Wooden foreign body.  It would appear that he has substantially healed his injury.    Plan:  Mr. Perry Ramirez is instructed in the appropriate short term protection of his healing injury.   He is  instructed in work on Active & Passive range of motion of the digits, wrist, & elbow.  These modalities were demonstrated to him today.  We discussed the continued restrictions on the use of the injured hand and the limitations on resumption of activities until full range of motion and comfort have been regained.  I have explained the time course and likely expectations for

## (undated) DEVICE — ZIMMER® STERILE DISPOSABLE TOURNIQUET CUFF WITH PLC, DUAL PORT, SINGLE BLADDER, 18 IN. (46 CM)

## (undated) DEVICE — SYRINGE IRRIG 60ML SFT PLIABLE BLB EZ TO GRP 1 HND USE W/

## (undated) DEVICE — GOWN SIRUS NONREIN XL W/TWL: Brand: MEDLINE INDUSTRIES, INC.

## (undated) DEVICE — PADDING,UNDERCAST,COTTON, 4"X4YD STERILE: Brand: MEDLINE

## (undated) DEVICE — BANDAGE COMPR W4INXL15FT BGE E SGL LAYERED CLP CLSR

## (undated) DEVICE — SOLUTION IRRIG 250ML 0.9% SOD CHL USP POUR PLAS BTL

## (undated) DEVICE — TUBING, SUCTION, 1/4" X 12', STRAIGHT: Brand: MEDLINE

## (undated) DEVICE — SYRINGE MED 50ML LUERLOCK TIP

## (undated) DEVICE — GLOVE SURG SZ 65 CRM LTX FREE POLYISOPRENE POLYMER BEAD ANTI

## (undated) DEVICE — GLOVE SURG SZ 65 L12IN FNGR THK79MIL GRN LTX FREE

## (undated) DEVICE — SYRINGE MED 20ML STD CLR PLAS LUERLOCK TIP N CTRL DISP

## (undated) DEVICE — GLOVE SURG SZ 75 CRM LTX FREE POLYISOPRENE POLYMER BEAD ANTI

## (undated) DEVICE — WILLIS PACK: Brand: MEDLINE INDUSTRIES, INC.